# Patient Record
Sex: MALE | Employment: FULL TIME | URBAN - METROPOLITAN AREA
[De-identification: names, ages, dates, MRNs, and addresses within clinical notes are randomized per-mention and may not be internally consistent; named-entity substitution may affect disease eponyms.]

---

## 2017-03-21 ENCOUNTER — OFFICE VISIT (OUTPATIENT)
Dept: OTOLARYNGOLOGY | Facility: CLINIC | Age: 47
End: 2017-03-21
Payer: COMMERCIAL

## 2017-03-21 VITALS — HEIGHT: 68 IN | WEIGHT: 175 LBS | RESPIRATION RATE: 12 BRPM | BODY MASS INDEX: 26.52 KG/M2

## 2017-03-21 DIAGNOSIS — H93.8X3 SENSATION OF FULLNESS IN BOTH EARS: ICD-10-CM

## 2017-03-21 DIAGNOSIS — H61.23 BILATERAL IMPACTED CERUMEN: Primary | ICD-10-CM

## 2017-03-21 PROCEDURE — 99214 OFFICE O/P EST MOD 30 MIN: CPT | Mod: 25 | Performed by: OTOLARYNGOLOGY

## 2017-03-21 PROCEDURE — 69210 REMOVE IMPACTED EAR WAX UNI: CPT | Performed by: OTOLARYNGOLOGY

## 2017-03-21 NOTE — MR AVS SNAPSHOT
After Visit Summary   3/21/2017    Gerardo Simpson    MRN: [id number]           Patient Information     Date Of Birth          1970        Visit Information        Provider Department      3/21/2017 12:30 PM Jason Bangura MD [facility]        Today's Diagnoses     Bilateral impacted cerumen    -  1    Sensation of fullness in both ears          Care Instructions    Scheduling Information  To schedule your CT/MRI scan, please contact [facility] at [phone] OR [facility] at [phone]    To schedule your Surgery, please contact our Specialty Schedulers at [phone]      ENT Clinic Locations Clinic Hours Telephone Number     [facility]  [address]  [place] [address]   Monday:           1:00pm -- 5:00pm    Friday:              8:00am - 12:00pm   To schedule/reschedule an appointment with   Dr. Bangura,   please contact our   Specialty Scheduling Department at:     [phone]       [facility]  [address] [place] [address] Tuesday:          8:00am -- 2:00pm         Urgent Care Locations Clinic Hours Telephone Numbers     [facility]  [address] [place] [address]     Monday-Friday:     11:00am - 9:00pm    Saturday-Sunday:  9:00am - 5:00pm   [phone]     [facility]  [address] [place] [address]     Monday-Friday:      5:00pm - 9:00pm     Saturday-Sunday:  9:00am - 5:00pm   [phone]               Follow-ups after your visit        Who to contact     If you have questions or need follow up information about today's clinic visit or your schedule please contact [facility] directly at [phone].  Normal or non-critical lab and imaging results will be communicated to you by MyChart, letter or phone within 4 business days after the clinic has received the results. If you do not hear from us within 7 days, please contact the clinic through [employer]  "or phone. If you have a critical or abnormal lab result, we will notify you by phone as soon as possible.  Submit refill requests through [employer] or call your pharmacy and they will forward the refill request to us. Please allow 3 business days for your refill to be completed.          Additional Information About Your Visit        [employer] Information     [employer] gives you secure access to your electronic health record. If you see a primary care provider, you can also send messages to your care team and make appointments. If you have questions, please call your primary care clinic.  If you do not have a primary care provider, please call [phone] and they will assist you.        Care EveryWhere ID     This is your Care EveryWhere ID. This could be used by other organizations to access your [facility] medical records  [id number]        Your Vitals Were     Respirations Height BMI (Body Mass Index)             12 1.727 m (5' 8\") 26.61 kg/m2          Blood Pressure from Last 3 Encounters:   08/11/16 129/77   04/12/16 133/79   12/31/15 141/76    Weight from Last 3 Encounters:   03/21/17 79.4 kg (175 lb)   08/11/16 78.5 kg (173 lb)   05/10/16 77.6 kg (171 lb)              We Performed the Following     Remove [facility]        Primary Care Provider Office Phone # Fax #    Adilenechristy Del Rio PA-C [phone]       [facility] [address] [facility] [address]        Thank you!     Thank you for choosing [facility]  for your care. Our goal is always to provide you with excellent care. Hearing back from our patients is one way we can continue to improve our services. Please take a few minutes to complete the written survey that you may receive in the mail after your visit with us. Thank you!             Your Updated Medication List - Protect others around you: Learn how to safely use, store and throw away your medicines at www.disposemymeds.org.          This list is " accurate as of: 3/21/17 12:46 PM.  Always use your most recent med list.                   Brand Name Dispense Instructions for use    carbamide peroxide 6.5 % otic solution    DEBROX     Place 5-10 drops into both ears once a week

## 2017-03-21 NOTE — PROGRESS NOTES
"History of Present Illness - Gerardo Simpson is a 46 year old male here in follow up from the last visit on 5/10/2016.    To review, he has history of ear problems.  About 20 years ago he was told by a doctor that the RIGHT ear needed a medication to help, because the RIGHT ear was not hearing well. After that everything seemed great, no problems at all until now.  He is not sure if there was infection or not.  He came to his PCP about one month ago, and was given ear wax softeners, but it has not helped. The RIGHT ear is still stuffy, \"like there is water\" in the ear.  No previous ear surgery, no bloody or purulent discharge from the RIGHT ear during this new issue.    I felt that the big issue was heavy cerumen impaction which I cleared out, but wanted to see him back to make sure that was the issue, with an audiogram in case it didn't.  So when he returned on 5/10/16, I cleared the ears of even more debris, and the audiogram was thankfully, normal.      Therefore my recommendation was ear checks and debridement every 6 months.    Past Medical History -   Patient Active Problem List   Diagnosis     CARDIOVASCULAR SCREENING; LDL GOAL LESS THAN 160     Overweight (BMI 25.0-29.9)     Elevated blood pressure reading without diagnosis of hypertension     Chronic constipation     Pinguecula of both eyes       Current Medications -   Current Outpatient Prescriptions:      carbamide peroxide (DEBROX) 6.5 % otic (EAR) solution, Place 5-10 drops into both ears once a week, Disp: , Rfl:     Allergies - No Known Allergies    Social History -   Social History     Social History     Marital status:      Spouse name: N/A     Number of children: 2     Years of education: N/A     Occupational History      [employer]     Social History Main Topics     Smoking status: Never Smoker     Smokeless tobacco: Never Used     Alcohol use No     Drug use: No     Sexual activity: Yes     Partners: Female      Comment:  "     Other Topics Concern     Not on file     Social History Narrative    From [facility], in  2012       Family History -   Family History   Problem Relation Age of Onset     Hypertension Father      C.A.D. No family hx of      CEREBROVASCULAR DISEASE No family hx of      Thyroid Disease No family hx of      Glaucoma No family hx of      Macular Degeneration No family hx of      DIABETES Mother      Breast Cancer Mother      CANCER Mother        Review of Systems - As per HPI and PMHx, otherwise 10+ system review of the head and neck, and general constitution is negative.    Physical Exam  B/P: Data Unavailable, T: Data Unavailable, P: Data Unavailable, R: Data Unavailable  Vitals: There were no vitals taken for this visit.  BMI= There is no height or weight on file to calculate BMI.    General - The patient is well nourished and well developed, and appears to have good nutritional status.  Alert and oriented to person and place, answers questions and cooperates with examination appropriately.   Head and Face - Normocephalic and atraumatic, with no gross asymmetry noted of the contour of the facial features.  The facial nerve is intact, with strong symmetric movements.  Voice and Breathing - The patient was breathing comfortably without the use of accessory muscles. There was no wheezing, stridor, or stertor.  The patients voice was clear and strong, and had appropriate pitch and quality.  Eyes - Extraocular movements intact, and the pupils were reactive to light.  Sclera were not icteric or injected, conjunctiva were pink and moist.  Mouth - Examination of the oral cavity showed pink, healthy oral mucosa. No lesions or ulcerations noted.  The tongue was mobile and midline, and the dentition were in good condition.    Throat - The walls of the oropharynx were smooth, pink, moist, symmetric, and had no lesions or ulcerations.  The tonsillar pillars and soft palate were symmetric.  The uvula was midline on elevation.     Neck - Normal midline excursion of the laryngotracheal complex during swallowing.  Full range of motion on passive movement.  Palpation of the occipital, submental, submandibular, internal jugular chain, and supraclavicular nodes did not demonstrate any abnormal lymph nodes or masses.      Cerumen Removal    Physical Exam and Procedure  Ears - On examination of the ears, I found that the BOTH ears were completely impacted with dense cerumen, and just as before it is tenaciously thick and sticky like peanut butter.  Therefore, I positioned them in the examination chair in a semi-supine position, beginning with the right side.  I used the binocular surgical microscope to perform cerumen removal.  I began by using a cerumen loop to gently lift the edges of the cerumen mass away from the walls of the external canal.  Once I did this, I was able to suction away fragments of wax and debris using suction.  Once the mass was loose enough, the entire plug was pulled from the canal.  The tympanic membrane was intact, no sign of perforation or middle ear effusion.    I turned my attention to the contralateral side once again using the binocular surgical microscope to perform cerumen removal.  I began by using a cerumen loop to gently lift the edges of the cerumen mass away from the walls of the external canal.  Once I did this, I was able to suction away fragments of wax and debris using suction.  Once the mass was loose enough, the entire plug was pulled from the canal.  The tympanic membrane was intact, no sign of perforation or middle ear effusion.      A/P - Gerardo Simpson is a 46 year old male  (H61.23) Bilateral impacted cerumen  (primary encounter diagnosis)  (H93.8X3) Sensation of fullness in both ears  Comment:    The patient has had their cerumen procedurally removed today.  I have discussed ear care at home, including avoiding qtips or any other object placed in the canal.  I have also discussed that over the  counter cerumen kits like Debrox or Cerumenex can be useful.    If no other issues, follow up with me in one year for an ear check.

## 2017-03-21 NOTE — NURSING NOTE
"Chief Complaint   Patient presents with     RECHECK     ears        Initial Resp 12  Ht 1.727 m (5' 8\")  Wt 79.4 kg (175 lb)  BMI 26.61 kg/m2 Estimated body mass index is 26.61 kg/(m^2) as calculated from the following:    Height as of this encounter: 1.727 m (5' 8\").    Weight as of this encounter: 79.4 kg (175 lb).  Medication Reconciliation: complete     Karlos Hummel CMA      "

## 2017-03-28 NOTE — PATIENT INSTRUCTIONS
Scheduling Information  To schedule your CT/MRI scan, please contact [facility] at [phone] OR [facility] at [phone]    To schedule your Surgery, please contact our Specialty Schedulers at [phone]      ENT Clinic Locations Clinic Hours Telephone Number     [facility]  [address] [place] [address]   Monday:           1:00pm -- 5:00pm    Friday:              8:00am - 12:00pm   To schedule/reschedule an appointment with   Dr. Bangura,   please contact our   Specialty Scheduling Department at:     [phone]       [facility]  [address] [place] [address] Tuesday:          8:00am -- 2:00pm         Urgent Care Locations Clinic Hours Telephone Numbers     [facility]  [address] [place] [address]     Monday-Friday:     11:00am - 9:00pm    Saturday-Sunday:  9:00am - 5:00pm   [phone]     [facility]  [address] [place] [address]     Monday-Friday:      5:00pm - 9:00pm     Saturday-Sunday:  9:00am - 5:00pm   [phone]          Yes

## 2017-05-08 ENCOUNTER — OFFICE VISIT (OUTPATIENT)
Dept: FAMILY MEDICINE | Facility: CLINIC | Age: 47
End: 2017-05-08
Payer: COMMERCIAL

## 2017-05-08 VITALS
OXYGEN SATURATION: 96 % | WEIGHT: 182 LBS | BODY MASS INDEX: 27.58 KG/M2 | DIASTOLIC BLOOD PRESSURE: 82 MMHG | SYSTOLIC BLOOD PRESSURE: 142 MMHG | TEMPERATURE: 97.3 F | HEART RATE: 68 BPM | HEIGHT: 68 IN

## 2017-05-08 DIAGNOSIS — K64.8 INTERNAL HEMORRHOID: Primary | ICD-10-CM

## 2017-05-08 PROCEDURE — 99213 OFFICE O/P EST LOW 20 MIN: CPT | Performed by: INTERNAL MEDICINE

## 2017-05-08 RX ORDER — HYDROCORTISONE ACETATE 25 MG/1
25 SUPPOSITORY RECTAL 2 TIMES DAILY
Qty: 28 SUPPOSITORY | Refills: 1 | Status: SHIPPED | OUTPATIENT
Start: 2017-05-08 | End: 2017-05-22

## 2017-05-08 NOTE — PROGRESS NOTES
SUBJECTIVE:                                                    Gerardo Simpson is a 46 year old male who presents to clinic today for the following health issues:      Concern - bloody stool     Onset: 1 week    Description:   Blood stool    Intensity: severe    Progression of Symptoms:  same    Accompanying Signs & Symptoms:  Normal stool and possible hemorrhoid       Previous history of similar problem:   YES    Precipitating factors:   Worsened by:     Alleviating factors:  Improved by:        Therapies Tried and outcome: none          Problem list and histories reviewed & adjusted, as indicated.  Additional history: as documented    Patient Active Problem List   Diagnosis     CARDIOVASCULAR SCREENING; LDL GOAL LESS THAN 160     Overweight (BMI 25.0-29.9)     Elevated blood pressure reading without diagnosis of hypertension     Chronic constipation     Pinguecula of both eyes     Bilateral impacted cerumen     Past Surgical History:   Procedure Laterality Date     NO HISTORY OF SURGERY         Social History   Substance Use Topics     Smoking status: Never Smoker     Smokeless tobacco: Never Used     Alcohol use No     Family History   Problem Relation Age of Onset     Hypertension Father      C.A.D. No family hx of      CEREBROVASCULAR DISEASE No family hx of      Thyroid Disease No family hx of      Glaucoma No family hx of      Macular Degeneration No family hx of      DIABETES Mother      Breast Cancer Mother      CANCER Mother          No Known Allergies  Recent Labs   Lab Test  04/25/14   1638   LDL  113   HDL  37*   TRIG  175*   CR  0.97   GFRESTIMATED  84   GFRESTBLACK  >90   POTASSIUM  3.9      BP Readings from Last 3 Encounters:   05/08/17 142/82   08/11/16 129/77   04/12/16 133/79    Wt Readings from Last 3 Encounters:   05/08/17 182 lb (82.6 kg)   03/21/17 175 lb (79.4 kg)   08/11/16 173 lb (78.5 kg)                      ROS:  C: NEGATIVE for fever, chills, change in weight  I: NEGATIVE for  worrisome rashes, moles or lesions  E: NEGATIVE for vision changes or irritation  E/M: NEGATIVE for ear, mouth and throat problems  R: NEGATIVE for significant cough or SOB  CV: NEGATIVE for chest pain, palpitations or peripheral edema  GI:   : NEGATIVE for frequency, dysuria, or hematuria  M: NEGATIVE for significant arthralgias or myalgia  N: NEGATIVE for weakness, dizziness or paresthesias  E: NEGATIVE for temperature intolerance, skin/hair changes  H: NEGATIVE for bleeding problems  P: NEGATIVE for changes in mood or affect    OBJECTIVE:                                                      Body mass index is 27.67 kg/(m^2).  GENERAL: healthy, alert and no distress  EYES: Eyes grossly normal to inspection  ABDOMEN: soft, nontender, no hepatosplenomegaly, no masses and bowel sounds normal  RECTAL (male): normal sphincter tone, no rectal masses and internal  Hemorrhoid palpated during rectal examination.  MS: no gross musculoskeletal defects noted, no edema  SKIN: no suspicious lesions or rashes  NEURO: Normal strength and tone, mentation intact and speech normal    Diagnostic Test Results:  none      ASSESSMENT/PLAN:                                                            (K64.8) Internal hemorrhoid  (primary encounter diagnosis)  Comment: No associated symptoms such as diarrhea or significant change in bowel habits suggestive of inflammatory bowel disease.  Plan: hydrocortisone (ANUSOL-HC) 25 MG Suppository,         GENERAL SURG ADULT REFERRAL            Follow-up visit if condition worsens.      Bryant Ward MD  [facility]

## 2017-05-08 NOTE — MR AVS SNAPSHOT
After Visit Summary   5/8/2017    Gerardo Simpson    MRN: [id number]           Patient Information     Date Of Birth          1970        Visit Information        Provider Department      5/8/2017 11:20 AM Bryant Ward MD [facility]        Today's Diagnoses     Internal hemorrhoid    -  1      Care Instructions    This summary includes the important diagnoses, test, medications and other important parts of your medical history.  Below are a few good we sites you can use to learn more about these.     Www.[employer] : Up to date and easily searchable information on multiple topics.  [email or web address] : [facility] $4.99 medications  Www.medlineCro[employer] Technology.gov : medication info, interactive tutorials, watch real surgeries online  Www.familydoctor.org : good info from the Academy of Family Physicians  Www.[employer] : good info from the [facility]  Www.cdc.gov : public health info, travel advisories, epidemics (H1N1)  Www.aap.org : children's health info, normal development, vaccinations  Www.health.[facility].[place].us : [place] dept of heatlh, public health issues in [place], N1N1    Based on your medical history and these are the current health maintenance or preventive care services that you are due for (some may have been done at this visit:)  There are no preventive care reminders to display for this patient.  =================================================================================  Normal Values   Blood pressure  <140/90 for most adults    <130/80 for some chronic diseases (ask your care team about yours)    BMI (body mass index)  18.5-25 kg/m2 (based on height and weight)     Thank you for visiting [facility]    Normal or non-critical lab and imaging results will be communicated to you by MyChart, letter or phone within 7 days.  If you do not hear from us within 10 days, please call the clinic. If you have a critical or  abnormal lab result, we will notify you by phone as soon as possible.     If you have any questions regarding your visit please contact:     Team Comfort:   Clinic Hours Telephone Number   Dr. Pavel Xiao   7am-5pm  Monday - Friday [phone]  [facility]   Pharmacy 8am-8pm Monday-Thursday      8am-6pm Friday  9am-5pm Saturday-Sunday [phone]   Urgent Care 11am-8pm Monday-Friday        9am-5pm Saturday-Sunday [phone]     After hours, weekend or if you need to make an appointment with your primary provider please call [phone].   After Hours nurse advise: call [facility]: [phone]    Medication Refills:  Call your pharmacy and they will forward the refill to us. Please allow 3 business days for your refills to be completed.    Use [employer] (secure email communication and access to your chart) to send your primary care provider a message or make an appointment. Ask someone on your Team how to sign up for [employer]. To log on to [employer] or for more information in Language Learning Class please visit the website at [email or web address].  As of October 8, 2013, all password changes, disabled accounts, or ID changes in [employer]/MyHealth will be done by our Access Services Department.   If you need help with your account or password, call: [phone]. Clinic staff no longer has the ability to change passwords.     Hemorrhoids    Hemorrhoids are swollen and inflamed veins inside the rectum and near the anus. The rectum is the last several inches of the colon. The anus is the passage between the rectum and the outside of the body.  Causes   The veins can become swollen due to increased pressure in them. This is most often caused by:    Chronic constipation or diarrhea    Straining when having a bowel movement    Sitting too long on the toilet    A low-fiber diet    Pregnancy  Symptoms     Bleeding from the rectum (this may be  noticeable after bowel movements)    Lump near the anus    Itching around the anus    Pain around the anus  There are different types of hemorrhoids. Depending on the type you have and the severity, you may be able to treat yourself at home. In some cases, a procedure may be the best treatment option. Your healthcare provider can tell you more about this, if needed.  Home care  General care    To get relief from pain or itching, try:    Topical products. Your healthcare provider may prescribe or recommend creams, ointments, or pads that can be applied to the hemorrhoid. Use these exactly as directed.    Medicines. Your healthcare provider may recommend stool softeners, suppositories, or laxatives to help manage constipation. Use these exactly as directed.    Sitz baths. A sitz bath involves sitting in a few inches of warm bath water. Be careful not to make the water so hot that you burn yourself--test it before sitting in it. Soak for about 10 to 15 minutes a few times a day. This may help relieve pain.  Tips to help prevent hemorrhoids    Eat more fiber. Fiber adds bulk to stool and absorbs water as it moves through your colon. This makes stool softer and easier to pass.    Increase the fiber in your diet with more fiber-rich foods. These include fresh fruit, vegetables, and whole grains.    Take a fiber supplement or bulking agent, if advised to by your provider. These include products such as psyllium or methylcellulose.    Drink plenty of water, if directed to by your provider. This can help keep stool soft.    Be more active. Frequent exercise aids digestion and helps prevent constipation. It may also help make bowel movements more regular.    Don t strain during bowel movements. This can make hemorrhoids more likely. Also, don t sit on the toilet for long periods of time.  Follow-up care  Follow up with your healthcare provider, or as advised. If a culture or imaging tests were done, you will be notified of  the results when they are ready. This may take a few days or longer.  When to seek medical advice  Call your healthcare provider right away if any of these occur:    Increased bleeding from the rectum    Increased pain around the rectum or anus    Weakness or dizziness   Call 911   Call 911 or return to the emergency department right away if any of these occur:    Trouble breathing or swallowing    Fainting or loss of consciousness    Unusually fast heart rate    Vomiting blood    Large amounts of blood in stool      8043-0386 [employer]. [address] [place] [address]. All rights reserved. This information is not intended as a substitute for professional medical care. Always follow your healthcare professional's instructions.        Understanding Hemorrhoids  Hemorrhoid tissues are  cushions  of blood vessels that swell slightly during bowel movements. Too much pressure on the anal canal can make these tissues remain enlarged and cause symptoms. This can happen both inside and outside the anal canal.    Parts of the Anal Canal    Internal hemorrhoid tissue is in the upper area of the anal canal.    The rectum is the last several inches of the colon. This is where stool is stored prior to bowel movements.    Anal sphincters are ring-shaped muscles that expand and contract to control the anal opening.    External hemorrhoid tissue lies under the anal skin.    The anus is the passage between the rectum and the outside of the body.  Normal Hemorrhoid Tissue  Hemorrhoid tissues play an important role in helping your body eliminate waste. Food passes from the stomach through the intestines. The waste (stool) then travels through the colon to the rectum. It is stored in the rectum until it s ready to be passed from the anus. During bowel movements, hemorrhoids swell with blood and become slightly larger. This swelling helps protect and cushion the anal canal as stool passes from the body. Once  the stool has passed, the tissues stop swelling and return to normal.    Problem Hemorrhoids  Pressure due to straining or other factors can cause hemorrhoid tissues to remain swollen. When this happens to the hemorrhoid tissues in the anal canal they re called internal hemorrhoids. Swollen tissues around the anal opening are called external hemorrhoids. Depending on the location, your symptoms can differ.    Internal hemorrhoids often occur in clusters around the wall of the anal canal. They are usually painless. But they may prolapse (protrude out of the anus) due to straining or pressure from hard stool. After the bowel movement is over, they may then reduce (return inside the body). Internal hemorrhoids often bleed. They can also discharge mucus.    External hemorrhoids are located at the anal opening, just beneath the skin. These tissues rarely cause problems unless they thrombose (form a blood clot). When this occurs, a hard, bluish lump may appear. A thrombosed hemorrhoid also causes sudden, severe pain. In time, the clot may go away on its own. This sometimes leaves a  skin tag  of tissue stretched by the clot.  Hemorrhoid Symptoms  Hemorrhoid symptoms may include:    Pain or a burning sensation    Bleeding during bowel movements    Protrusion of tissue from the anus    Itching around the anus  Causes of Hemorrhoids  There s no single cause of hemorrhoids. Most often, though, they are caused by too much pressure on the anal canal. This can be due to:    Chronic (ongoing) constipation    Straining during bowel movements    Sitting too long on the toilet    Strenuous exercise or heavy lifting   Pregnancy and childbirth    Aging    Diarrhea     4316-0054 [employer]. [address] [place] [address], [place] [address]. All rights reserved. This information is not intended as a substitute for professional medical care. Always follow your healthcare professional's instructions.        Treating Hemorrhoids:  Surgery  Your doctor may recommend surgery to remove hemorrhoids that cause severe symptoms. Your doctor can explain the procedure that will be used. You ll also be told how to get ready for surgery, and what to expect while you recover.  Getting Ready for Surgery  Your surgery will be done at a hospital or surgical center. Be sure to follow all your doctor s guidelines to prepare for surgery.    Tell your doctor what medications you take. This includes blood thinners, aspirin, and ibuprofen. Also mention if you take any herbal remedies or supplements. In some cases, you may need to stop taking them a week before surgery.    Stop smoking.    Arrange for an adult family member or friend to give you a ride home after the procedure.    Stop eating and drinking before midnight, the night before your surgery.        Risks and Complications  The possible risks and complications of the treatments described on these pages include:    Infection    Bleeding    Trouble urinating    Narrowing of the anal canal (very rare)  When to Call Your Doctor  After surgery, call your doctor if you have any of the following:    Increasing pain    Persistent bleeding    Fever or chills    Inability to move your bowels    Trouble urinating   The Day of Surgery  Arrive at the hospital or surgery center on time. You will be asked to sign some forms and change into a patient gown. You ll then be given an IV (intravenous line), which supplies fluids and medication. You may also be given a laxative or enema to clean stool from your rectum. Just before surgery, you ll talk with an anesthesiologist. He or she can explain the type of medication used to prevent pain during surgery.    Local anesthesia numbs just the surgical area.    Monitored sedation makes you relaxed and drowsy.    Regional anesthesia numbs certain areas of your body.    General anesthesia lets you sleep during the procedure.  During Surgery  Your doctor will insert an anoscope to  view the anal canal. Using surgical tools, the swollen hemorrhoids are then removed. In some cases, the incision is closed with sutures. In other cases, you may have a procedure that closes the incision with staples.  Hemorrhoidectomy with Sutures  The hemorrhoids are removed using surgical tools, such as a scalpel or cautery (sealing) device. The incision is then closed with sutures. In some cases, the incision may be left partially open. This allows fluid to drain and helps the healing process.       Staples help prevent tissue in the anal canal from sagging and prolapsing.   Stapled Hemorrhoidopexy  This procedure uses a special device to remove a ring of tissue from the anal canal. Removing the tissue cuts off blood supply to the hemorrhoids, causing them to shrink. The tissue ring is then secured with staples. This helps hold the tissue in place.  After Surgery  You ll be taken to a recovery area to rest for a while. You can usually go home the same day. But in some cases you may need to remain in the hospital overnight. For a short time after surgery you may have nausea, minor bleeding, and discharge. You ll also likely have some pain. To help you feel better, your doctor will prescribe pain medication. You may also be prescribed medications to help make bowel movements easier.    1806-0471 The "[employer] [address] [place] [address], [place] [address]. All rights reserved. This information is not intended as a substitute for professional medical care. Always follow your healthcare professional's instructions.              Follow-ups after your visit        Who to contact     If you have questions or need follow up information about today's clinic visit or your schedule please contact [facility] directly at [phone].  Normal or non-critical lab and imaging results will be communicated to you by MyChart, letter or phone within 4 business days after the clinic has received the  "results. If you do not hear from us within 7 days, please contact the clinic through [employer] or phone. If you have a critical or abnormal lab result, we will notify you by phone as soon as possible.  Submit refill requests through [employer] or call your pharmacy and they will forward the refill request to us. Please allow 3 business days for your refill to be completed.          Additional Information About Your Visit        [employer] Information     [employer] gives you secure access to your electronic health record. If you see a primary care provider, you can also send messages to your care team and make appointments. If you have questions, please call your primary care clinic.  If you do not have a primary care provider, please call [phone] and they will assist you.        Care EveryWhere ID     This is your Care EveryWhere ID. This could be used by other organizations to access your [facility] medical records  [id number]        Your Vitals Were     Pulse Temperature Height Pulse Oximetry BMI (Body Mass Index)       68 97.3  F (36.3  C) (Oral) 5' 8\" (1.727 m) 96% 27.67 kg/m2        Blood Pressure from Last 3 Encounters:   05/08/17 142/82   08/11/16 129/77   04/12/16 133/79    Weight from Last 3 Encounters:   05/08/17 182 lb (82.6 kg)   03/21/17 175 lb (79.4 kg)   08/11/16 173 lb (78.5 kg)              Today, you had the following     No orders found for display         Today's Medication Changes          These changes are accurate as of: 5/8/17 12:06 PM.  If you have any questions, ask your nurse or doctor.               Start taking these medicines.        Dose/Directions    hydrocortisone 25 MG Suppository   Commonly known as:  ANUSOL-HC   Used for:  Internal hemorrhoid   Started by:  Bryant Ward MD        Dose:  25 mg   Place 1 suppository (25 mg) rectally 2 times daily for 14 days   Quantity:  28 suppository   Refills:  1            Where to get your medicines      These medications were sent [place] " [facility] [place] - [address]  [address], [facility] [address]     Phone:  [phone]     hydrocortisone 25 MG Suppository                Primary Care Provider Office Phone # Fax #    Adilene Maggy Del Rio PA-C [phone]       [facility] [address]  [facility] [address]        Thank you!     Thank you for choosing [facility]  for your care. Our goal is always to provide you with excellent care. Hearing back from our patients is one way we can continue to improve our services. Please take a few minutes to complete the written survey that you may receive in the mail after your visit with us. Thank you!             Your Updated Medication List - Protect others around you: Learn how to safely use, store and throw away your medicines at www.disposemymeds.org.          This list is accurate as of: 5/8/17 12:06 PM.  Always use your most recent med list.                   Brand Name Dispense Instructions for use    hydrocortisone 25 MG Suppository    ANUSOL-HC    28 suppository    Place 1 suppository (25 mg) rectally 2 times daily for 14 days

## 2017-05-08 NOTE — PATIENT INSTRUCTIONS
This summary includes the important diagnoses, test, medications and other important parts of your medical history.  Below are a few good we sites you can use to learn more about these.     Www.[employer]tc[employer] Edutainment.org : Up to date and easily searchable information on multiple topics.  Www.[employer]tc[employer] Edutainment.org/[email or web address]/c_[email or web address] : [facility] $4.99 medications  Www.medlineplus.gov : medication info, interactive tutorials, watch real surgeries online  Www.familydoctor.org : good info from the Academy of Family Physicians  Www.mayoXinyi Networkinic.com : good info from the [facility]  Www.cdc.gov : public health info, travel advisories, epidemics (H1N1)  Www.aap.org : children's health info, normal development, vaccinations  Www.health.[facility].[place].us : [place] dept of heat, public health issues in [place], N1N1    Based on your medical history and these are the current health maintenance or preventive care services that you are due for (some may have been done at this visit:)  There are no preventive care reminders to display for this patient.  =================================================================================  Normal Values   Blood pressure  <140/90 for most adults    <130/80 for some chronic diseases (ask your care team about yours)    BMI (body mass index)  18.5-25 kg/m2 (based on height and weight)     Thank you for visiting [facility]    Normal or non-critical lab and imaging results will be communicated to you by MyChart, letter or phone within 7 days.  If you do not hear from us within 10 days, please call the clinic. If you have a critical or abnormal lab result, we will notify you by phone as soon as possible.     If you have any questions regarding your visit please contact:     Team Comfort:   Clinic Hours Telephone Number   Dr. Pavel Xiao   7am-5pm  Monday - Friday [phone]  [facility]   Pharmacy 8am-8pm  Monday-Thursday      8am-6pm Friday  9am-5pm Saturday-Sunday [phone]   Urgent Care 11am-8pm Monday-Friday        9am-5pm Saturday-Sunday [phone]     After hours, weekend or if you need to make an appointment with your primary provider please call [phone].   After Hours nurse advise: call [facility]: [phone]    Medication Refills:  Call your pharmacy and they will forward the refill to us. Please allow 3 business days for your refills to be completed.    Use [employer] Ideas (secure email communication and access to your chart) to send your primary care provider a message or make an appointment. Ask someone on your Team how to sign up for Engineering Ideas. To log on to [employer] or for more information in [employer] please visit the website at [email or web address].  As of October 8, 2013, all password changes, disabled accounts, or ID changes in Engineering Ideas/MyHealth will be done by our Access Services Department.   If you need help with your account or password, call: [phone]. Clinic staff no longer has the ability to change passwords.     Hemorrhoids    Hemorrhoids are swollen and inflamed veins inside the rectum and near the anus. The rectum is the last several inches of the colon. The anus is the passage between the rectum and the outside of the body.  Causes   The veins can become swollen due to increased pressure in them. This is most often caused by:    Chronic constipation or diarrhea    Straining when having a bowel movement    Sitting too long on the toilet    A low-fiber diet    Pregnancy  Symptoms     Bleeding from the rectum (this may be noticeable after bowel movements)    Lump near the anus    Itching around the anus    Pain around the anus  There are different types of hemorrhoids. Depending on the type you have and the severity, you may be able to treat yourself at home. In some cases, a procedure may be the best treatment option. Your healthcare provider can tell you more about  this, if needed.  Home care  General care    To get relief from pain or itching, try:    Topical products. Your healthcare provider may prescribe or recommend creams, ointments, or pads that can be applied to the hemorrhoid. Use these exactly as directed.    Medicines. Your healthcare provider may recommend stool softeners, suppositories, or laxatives to help manage constipation. Use these exactly as directed.    Sitz baths. A sitz bath involves sitting in a few inches of warm bath water. Be careful not to make the water so hot that you burn yourself--test it before sitting in it. Soak for about 10 to 15 minutes a few times a day. This may help relieve pain.  Tips to help prevent hemorrhoids    Eat more fiber. Fiber adds bulk to stool and absorbs water as it moves through your colon. This makes stool softer and easier to pass.    Increase the fiber in your diet with more fiber-rich foods. These include fresh fruit, vegetables, and whole grains.    Take a fiber supplement or bulking agent, if advised to by your provider. These include products such as psyllium or methylcellulose.    Drink plenty of water, if directed to by your provider. This can help keep stool soft.    Be more active. Frequent exercise aids digestion and helps prevent constipation. It may also help make bowel movements more regular.    Don t strain during bowel movements. This can make hemorrhoids more likely. Also, don t sit on the toilet for long periods of time.  Follow-up care  Follow up with your healthcare provider, or as advised. If a culture or imaging tests were done, you will be notified of the results when they are ready. This may take a few days or longer.  When to seek medical advice  Call your healthcare provider right away if any of these occur:    Increased bleeding from the rectum    Increased pain around the rectum or anus    Weakness or dizziness   Call 911   Call 911 or return to the emergency department right away if any of  these occur:    Trouble breathing or swallowing    Fainting or loss of consciousness    Unusually fast heart rate    Vomiting blood    Large amounts of blood in stool      2293-6670 [employer]. [address] [place] [address], [place] [address]. All rights reserved. This information is not intended as a substitute for professional medical care. Always follow your healthcare professional's instructions.        Understanding Hemorrhoids  Hemorrhoid tissues are  cushions  of blood vessels that swell slightly during bowel movements. Too much pressure on the anal canal can make these tissues remain enlarged and cause symptoms. This can happen both inside and outside the anal canal.    Parts of the Anal Canal    Internal hemorrhoid tissue is in the upper area of the anal canal.    The rectum is the last several inches of the colon. This is where stool is stored prior to bowel movements.    Anal sphincters are ring-shaped muscles that expand and contract to control the anal opening.    External hemorrhoid tissue lies under the anal skin.    The anus is the passage between the rectum and the outside of the body.  Normal Hemorrhoid Tissue  Hemorrhoid tissues play an important role in helping your body eliminate waste. Food passes from the stomach through the intestines. The waste (stool) then travels through the colon to the rectum. It is stored in the rectum until it s ready to be passed from the anus. During bowel movements, hemorrhoids swell with blood and become slightly larger. This swelling helps protect and cushion the anal canal as stool passes from the body. Once the stool has passed, the tissues stop swelling and return to normal.    Problem Hemorrhoids  Pressure due to straining or other factors can cause hemorrhoid tissues to remain swollen. When this happens to the hemorrhoid tissues in the anal canal they re called internal hemorrhoids. Swollen tissues around the anal opening are called external  hemorrhoids. Depending on the location, your symptoms can differ.    Internal hemorrhoids often occur in clusters around the wall of the anal canal. They are usually painless. But they may prolapse (protrude out of the anus) due to straining or pressure from hard stool. After the bowel movement is over, they may then reduce (return inside the body). Internal hemorrhoids often bleed. They can also discharge mucus.    External hemorrhoids are located at the anal opening, just beneath the skin. These tissues rarely cause problems unless they thrombose (form a blood clot). When this occurs, a hard, bluish lump may appear. A thrombosed hemorrhoid also causes sudden, severe pain. In time, the clot may go away on its own. This sometimes leaves a  skin tag  of tissue stretched by the clot.  Hemorrhoid Symptoms  Hemorrhoid symptoms may include:    Pain or a burning sensation    Bleeding during bowel movements    Protrusion of tissue from the anus    Itching around the anus  Causes of Hemorrhoids  There s no single cause of hemorrhoids. Most often, though, they are caused by too much pressure on the anal canal. This can be due to:    Chronic (ongoing) constipation    Straining during bowel movements    Sitting too long on the toilet    Strenuous exercise or heavy lifting   Pregnancy and childbirth    Aging    Diarrhea     [id number] [employer]. [address] [place] [address], [place] [address]. All rights reserved. This information is not intended as a substitute for professional medical care. Always follow your healthcare professional's instructions.        Treating Hemorrhoids: Surgery  Your doctor may recommend surgery to remove hemorrhoids that cause severe symptoms. Your doctor can explain the procedure that will be used. You ll also be told how to get ready for surgery, and what to expect while you recover.  Getting Ready for Surgery  Your surgery will be done at a hospital or surgical center. Be sure to follow  all your doctor s guidelines to prepare for surgery.    Tell your doctor what medications you take. This includes blood thinners, aspirin, and ibuprofen. Also mention if you take any herbal remedies or supplements. In some cases, you may need to stop taking them a week before surgery.    Stop smoking.    Arrange for an adult family member or friend to give you a ride home after the procedure.    Stop eating and drinking before midnight, the night before your surgery.        Risks and Complications  The possible risks and complications of the treatments described on these pages include:    Infection    Bleeding    Trouble urinating    Narrowing of the anal canal (very rare)  When to Call Your Doctor  After surgery, call your doctor if you have any of the following:    Increasing pain    Persistent bleeding    Fever or chills    Inability to move your bowels    Trouble urinating   The Day of Surgery  Arrive at the hospital or surgery center on time. You will be asked to sign some forms and change into a patient gown. You ll then be given an IV (intravenous line), which supplies fluids and medication. You may also be given a laxative or enema to clean stool from your rectum. Just before surgery, you ll talk with an anesthesiologist. He or she can explain the type of medication used to prevent pain during surgery.    Local anesthesia numbs just the surgical area.    Monitored sedation makes you relaxed and drowsy.    Regional anesthesia numbs certain areas of your body.    General anesthesia lets you sleep during the procedure.  During Surgery  Your doctor will insert an anoscope to view the anal canal. Using surgical tools, the swollen hemorrhoids are then removed. In some cases, the incision is closed with sutures. In other cases, you may have a procedure that closes the incision with staples.  Hemorrhoidectomy with Sutures  The hemorrhoids are removed using surgical tools, such as a scalpel or cautery (sealing)  device. The incision is then closed with sutures. In some cases, the incision may be left partially open. This allows fluid to drain and helps the healing process.       Staples help prevent tissue in the anal canal from sagging and prolapsing.   Stapled Hemorrhoidopexy  This procedure uses a special device to remove a ring of tissue from the anal canal. Removing the tissue cuts off blood supply to the hemorrhoids, causing them to shrink. The tissue ring is then secured with staples. This helps hold the tissue in place.  After Surgery  You ll be taken to a recovery area to rest for a while. You can usually go home the same day. But in some cases you may need to remain in the hospital overnight. For a short time after surgery you may have nausea, minor bleeding, and discharge. You ll also likely have some pain. To help you feel better, your doctor will prescribe pain medication. You may also be prescribed medications to help make bowel movements easier.    [id number] [employer]. [address] [place] [address], [place] [address]. All rights reserved. This information is not intended as a substitute for professional medical care. Always follow your healthcare professional's instructions.

## 2017-05-08 NOTE — NURSING NOTE
"Chief Complaint   Patient presents with     Rectal Bleeding     bloody stool       Initial /85 (BP Location: Left arm, Patient Position: Chair, Cuff Size: Adult Regular)  Pulse 68  Temp 97.3  F (36.3  C) (Oral)  Ht 5' 8\" (1.727 m)  Wt 182 lb (82.6 kg)  SpO2 96%  BMI 27.67 kg/m2 Estimated body mass index is 27.67 kg/(m^2) as calculated from the following:    Height as of this encounter: 5' 8\" (1.727 m).    Weight as of this encounter: 182 lb (82.6 kg).  Medication Reconciliation: complete     Omar Martinez MA      "

## 2017-12-12 ENCOUNTER — OFFICE VISIT (OUTPATIENT)
Dept: FAMILY MEDICINE | Facility: CLINIC | Age: 47
End: 2017-12-12
Payer: COMMERCIAL

## 2017-12-12 VITALS
HEIGHT: 68 IN | HEART RATE: 66 BPM | OXYGEN SATURATION: 98 % | BODY MASS INDEX: 28.49 KG/M2 | DIASTOLIC BLOOD PRESSURE: 70 MMHG | SYSTOLIC BLOOD PRESSURE: 156 MMHG | TEMPERATURE: 97.8 F | WEIGHT: 188 LBS

## 2017-12-12 DIAGNOSIS — Z11.1 SCREENING EXAMINATION FOR PULMONARY TUBERCULOSIS: ICD-10-CM

## 2017-12-12 DIAGNOSIS — Z13.6 CARDIOVASCULAR SCREENING; LDL GOAL LESS THAN 160: ICD-10-CM

## 2017-12-12 DIAGNOSIS — Z00.00 ENCOUNTER FOR ROUTINE ADULT HEALTH EXAMINATION WITHOUT ABNORMAL FINDINGS: Primary | ICD-10-CM

## 2017-12-12 DIAGNOSIS — Z23 NEED FOR PROPHYLACTIC VACCINATION AND INOCULATION AGAINST INFLUENZA: ICD-10-CM

## 2017-12-12 DIAGNOSIS — R03.0 ELEVATED BLOOD PRESSURE READING WITHOUT DIAGNOSIS OF HYPERTENSION: ICD-10-CM

## 2017-12-12 LAB
ANION GAP SERPL CALCULATED.3IONS-SCNC: 11 MMOL/L (ref 3–14)
BUN SERPL-MCNC: 9 MG/DL (ref 7–30)
CALCIUM SERPL-MCNC: 8.8 MG/DL (ref 8.5–10.1)
CHLORIDE SERPL-SCNC: 104 MMOL/L (ref 94–109)
CHOLEST SERPL-MCNC: 202 MG/DL
CO2 SERPL-SCNC: 25 MMOL/L (ref 20–32)
CREAT SERPL-MCNC: 0.92 MG/DL (ref 0.66–1.25)
GFR SERPL CREATININE-BSD FRML MDRD: 88 ML/MIN/1.7M2
GLUCOSE SERPL-MCNC: 103 MG/DL (ref 70–99)
HDLC SERPL-MCNC: 54 MG/DL
LDLC SERPL CALC-MCNC: 103 MG/DL
NONHDLC SERPL-MCNC: 148 MG/DL
POTASSIUM SERPL-SCNC: 3.4 MMOL/L (ref 3.4–5.3)
SODIUM SERPL-SCNC: 140 MMOL/L (ref 133–144)
TRIGL SERPL-MCNC: 227 MG/DL

## 2017-12-12 PROCEDURE — 80048 BASIC METABOLIC PNL TOTAL CA: CPT | Performed by: FAMILY MEDICINE

## 2017-12-12 PROCEDURE — 99396 PREV VISIT EST AGE 40-64: CPT | Mod: 25 | Performed by: FAMILY MEDICINE

## 2017-12-12 PROCEDURE — 80061 LIPID PANEL: CPT | Performed by: FAMILY MEDICINE

## 2017-12-12 PROCEDURE — 36415 COLL VENOUS BLD VENIPUNCTURE: CPT | Performed by: FAMILY MEDICINE

## 2017-12-12 PROCEDURE — 90686 IIV4 VACC NO PRSV 0.5 ML IM: CPT | Performed by: FAMILY MEDICINE

## 2017-12-12 PROCEDURE — 90471 IMMUNIZATION ADMIN: CPT | Performed by: FAMILY MEDICINE

## 2017-12-12 PROCEDURE — 86480 TB TEST CELL IMMUN MEASURE: CPT | Performed by: FAMILY MEDICINE

## 2017-12-12 ASSESSMENT — PAIN SCALES - GENERAL: PAINLEVEL: NO PAIN (0)

## 2017-12-12 NOTE — PROGRESS NOTES
SUBJECTIVE:   CC: Gerardo Simpson is an 47 year old male who presents for preventative health visit.     He also brings forms to fill out regarding his preventative health visit and his [place] record for [place] [facility] [employer] [facility] [place]    Healthy Habits:    Do you get at least three servings of calcium containing foods daily (dairy, green leafy vegetables, etc.)? yes    Amount of exercise or daily activities, outside of work: 7 day(s) per week    Problems taking medications regularly not applicable    Medication side effects: No    Have you had an eye exam in the past two years? yes    Do you see a dentist twice per year? no    Do you have sleep apnea, excessive snoring or daytime drowsiness?no      Today's PHQ-2 Score:   PHQ-2 ( 1999 Pfizer) 12/12/2017 5/8/2017   Q1: Little interest or pleasure in doing things 0 0   Q2: Feeling down, depressed or hopeless 0 0   PHQ-2 Score 0 0       Abuse: Current or Past(Physical, Sexual or Emotional)- NO  Do you feel safe in your environment - YES  Social History   Substance Use Topics     Smoking status: Never Smoker     Smokeless tobacco: Never Used     Alcohol use No     The patient does not drink >3 drinks per day nor >7 drinks per week.    Past medical, family, and social histories, medications, and allergies are reviewed and updated in Epic.     ROS:  C: NEGATIVE for fever, chills, change in weight  I: NEGATIVE for worrisome rashes, moles or lesions  E: NEGATIVE for vision changes or irritation  ENT: NEGATIVE for ear, mouth and throat problems  R: NEGATIVE for significant cough or SOB  CV: He mentions he has not gotten a lot of sleep recently, which could contribute to his high blood pressure reading today. NEGATIVE for chest pain, palpitations or peripheral edema  GI: NEGATIVE for nausea, abdominal pain, heartburn, or change in bowel habits   male: negative for dysuria, hematuria, decreased urinary stream, erectile dysfunction, urethral discharge  M:  "NEGATIVE for significant arthralgias or myalgia  N: NEGATIVE for weakness, dizziness or paresthesias  P: NEGATIVE for changes in mood or affect    This document serves as a record of the services and decisions personally performed and made by Dr. Hidalgo. It was created on his behalf by Lauren Hawthorne, a trained medical scribe. The creation of this document is based the provider's statements to the medical scribe.  Lauren Hawthorne December 12, 2017 11:23 AM   OBJECTIVE:   /70  Pulse 66  Temp 97.8  F (36.6  C) (Oral)  Ht 1.727 m (5' 8\")  Wt 85.3 kg (188 lb)  SpO2 98%  BMI 28.59 kg/m2     EXAM:  GENERAL: healthy, alert and no distress  EYES: Eyes grossly normal to inspection, PERRL and conjunctivae and sclerae normal  HENT: ear canals and TM's normal, nose and mouth without ulcers or lesions  NECK: no adenopathy, no asymmetry, masses, or scars and thyroid normal to palpation  RESP: lungs clear to auscultation, no crackles or wheezes, no areas of dullness, no tachypnea   CV: regular rate and rhythm, normal S1 S2, no S3 or S4, no murmur, click or rub, no peripheral edema and peripheral pulses strong  ABDOMEN: soft, nontender, no hepatosplenomegaly, no masses and bowel sounds normal   (male): normal male genitalia without lesions or urethral discharge, no hernia  MS: no gross musculoskeletal defects noted, no edema  SKIN: no suspicious lesions or rashes  NEURO: Normal strength and tone, mentation intact and speech normal, DTRs symmetrical, cranial nerves 2-12 intact   PSYCH: mentation appears normal, affect normal/bright     ASSESSMENT/PLAN:   (Z00.00) Encounter for routine adult health examination without abnormal findings  (primary encounter diagnosis)  Comment: Negative screening exam; up-to-date on preventive services.   Plan: M Tuberculosis by [facility], HC FLU VAC         PRESRV FREE QUAD SPLIT VIR 3+YRS IM, Lipid         panel reflex to direct LDL Non-fasting, Basic         metabolic panel       " " Follow up in 1 year.    (R03.0) Elevated blood pressure reading without diagnosis of hypertension  Comment: Second hypertensive reading today.  Plan: Basic metabolic panel        Handouts provided. Return in about 1 week (around 12/19/2017) for blood pressure check. If elevated then, will start medication.    (Z13.6) CARDIOVASCULAR SCREENING; LDL GOAL LESS THAN 160  Comment: historically at goal. Non-fasting (last had juice around 10:30am).  Plan: Lipid panel reflex to direct LDL Non-fasting        Monitor periodically.     (Z11.1) Screening examination for pulmonary tuberculosis  Comment: Screening requested by the patient.  Plan: M Tuberculosis by [facility]            (Z23) Need for prophylactic vaccination and inoculation against influenza  Comment: Influenza vaccine offered and accepted by patient.   Plan: HC FLU VAC PRESRV FREE QUAD SPLIT VIR 3+YRS IM            COUNSELING:  Reviewed preventive health counseling, as reflected in patient instructions  Special attention given to:        Regular exercise       Immunizations    Vaccinated for: Influenza        BP Screening:   Last 3 BP Readings:    BP Readings from Last 3 Encounters:   12/12/17 156/70   05/08/17 142/82   08/11/16 129/77     The following was recommended to the patient:  Recommend lifestyle modifications and Laboratory tests and follow up appointment in 1 week.       reports that he has never smoked. He has never used smokeless tobacco.      Estimated body mass index is 28.59 kg/(m^2) as calculated from the following:    Height as of this encounter: 1.727 m (5' 8\").    Weight as of this encounter: 85.3 kg (188 lb).   Weight management plan: encouraged increased aerobic exercise, as outlined in the AVS. He walks for exercise, around 30 minutes minimum.     Counseling Resources:  ATP IV Guidelines  Pooled Cohorts Equation Calculator  FRAX Risk Assessment  ICSI Preventive Guidelines  Dietary Guidelines for Americans, 2010  USDA's MyPlate  ASA " Prophylaxis  Lung CA Screening    The information in this document, created by the medical scribe for me, accurately reflects the services I personally performed and the decisions made by me. I have reviewed and approved this document for accuracy prior to leaving the patient care area. December 12, 2017 11:23 AM    Pavel Hidalgo MD  [facility]                    Injectable Influenza Immunization Documentation    1.  Is the person to be vaccinated sick today?   No    2. Does the person to be vaccinated have an allergy to a component   of the vaccine?   No  Egg Allergy Algorithm Link    3. Has the person to be vaccinated ever had a serious reaction   to influenza vaccine in the past?   No    4. Has the person to be vaccinated ever had Guillain-Barré syndrome?   No    Form completed by CONSTANCE Tabares MA

## 2017-12-12 NOTE — PATIENT INSTRUCTIONS
At [facility], we strive to deliver an exceptional experience to you, every time we see you.  If you receive a survey in the mail, please send us back your thoughts. We really do value your feedback.    Based on your medical history, these are the current health maintenance/preventive care services that you are due for (some may have been done at this visit.)  Health Maintenance Due   Topic Date Due     EYE EXAM Q1 YEAR  08/10/2017     INFLUENZA VACCINE (SYSTEM ASSIGNED)  09/01/2017         Suggested websites for health information:  Www.Laimoon.com.HomeCon : Up to date and easily searchable information on multiple topics.  Www.FreeATM.gov : medication info, interactive tutorials, watch real surgeries online  Www.familydoctor.org : good info from the Academy of Family Physicians  Www.cdc.gov : public health info, travel advisories, epidemics (H1N1)  Www.aap.org : children's health info, normal development, vaccinations  Www.health.[facility].[place].us : [place] dept of health, public health issues in [place], N1N1    Your care team:                            Family Medicine Internal Medicine   MD Bryant Mac MD Shantel Branch-Fleming, MD Katya Georgiev PA-C Nam Ho, MD Pediatrics   THERESE Vega, BRYANNA FRANCOIS CNP   MD Yane Green MD Deborah Mielke, MD Kim Thein, APRN [facility]      Clinic hours: Monday - Thursday 7 am-7 pm; Fridays 7 am-5 pm.   Urgent care: Monday - Friday 11 am-9 pm; Saturday and Sunday 9 am-5 pm.  Pharmacy : Monday -Thursday 8 am-8 pm; Friday 8 am-6 pm; Saturday and Sunday 9 am-5 pm.     Clinic: [phone]   Pharmacy: [phone]      Preventive Health Recommendations  Male Ages 40 to 49    Yearly exam:             See your health care provider every year in order to  o   Review health changes.   o   Discuss preventive care.    o   Review your medicines if your doctor has prescribed any.    You should be tested  each year for STDs (sexually transmitted diseases) if you re at risk.     Have a cholesterol test every 5 years.     Have a colonoscopy (test for colon cancer) if someone in your family has had colon cancer or polyps before age 50.     After age 45, have a diabetes test (fasting glucose). If you are at risk for diabetes, you should have this test every 3 years.      Talk with your health care provider about whether or not a prostate cancer screening test (PSA) is right for you.    Shots: Get a flu shot each year. Get a tetanus shot every 10 years.     Nutrition:    Eat at least 5 servings of fruits and vegetables daily.     Eat whole-grain bread, whole-wheat pasta and brown rice instead of white grains and rice.     Talk to your provider about Calcium and Vitamin D.     Lifestyle    Aerobic exercise for at least 150 minutes a week (40+ minutes per day, 4-6 days per week). This will help you control your weight and prevent disease.      Limit alcohol to one drink per day.     No smoking.     Wear sunscreen to prevent skin cancer.     See your dentist every six months for an exam and cleaning.             High Blood Pressure, To Be Confirmed, No Treatment  Your blood pressure today was higher than normal. Sometimes anxiety or pain can cause a temporary rise in blood pressure. It later returns to normal. Blood pressure that is high only one time doesn t mean that you have high blood pressure (hypertension). High blood pressure is a chronic illness. But you should have your blood pressure measured again within the next few days to find out if it s still high.    A blood pressure reading is made up of two numbers: a higher number over a lower number. The top number is the systolic pressure. The bottom number is the diastolic pressure. A normal blood pressure is a systolic pressure of less than 120 over a diastolic pressure of less than 80. You will see your blood pressure readings written together. For example, a person  with a systolic pressure of 118 and a diastolic pressure of 78 will have 118/78 written in the medical record.     High blood pressure is when either the top number is 140 or higher, or the bottom number is 90 or higher. This must be the result when taking your blood pressure a number of times.   The blood pressures between normal and high are called prehypertension. This is systolic pressure of 120 to 140 or diastolic pressure of 80 to 89. Prehypertension means you are at risk of getting high blood pressure. It's a warning sign. The information gives you a chance to  make lifestyle changes such as weight loss, exercise, and quitting smoking, that can keep your blood pressure from going higher. You should have your blood pressure checked regularly to be sure it isn t rising.  Home care  To track your blood pressure, your provider may ask you to come into the office at different times and on different days. If your healthcare provider asks you to check your readings at home, ask him or her what times of the day to test and for how many days. Before you leave the office, ask your provider to show you how to take your blood pressure and be sure to ask questions if you don't understand something.  Consider buying an automatic blood pressure monitor. Ask your provider for a recommendation. You can buy blood pressure monitors at most pharmacies.  The American Heart Association recommends the following guidelines for home blood pressure monitoring:    Don't smoke or drink coffee for 30 minutes before taking your blood pressure.    Go to the bathroom before the test.    Relax for 5 minutes before taking the measurement.    Sit with your back supported (don't sit on a couch or soft chair); keep your feet on the floor uncrossed. Place your arm on a solid flat surface (like a table) with the upper part of the arm at heart level. Place the middle of the cuff directly above the eye of the elbow. Check the monitor's instruction  manual for an illustration.    Take multiple readings. When you measure, take 2 to 3 readings one minute apart and record all of the results.    Take your blood pressure at the same time every day, or as your healthcare provider recommends.    Record the date, time, and blood pressure reading.    Take the record with you to your next medical appointment. If your blood pressure monitor has a built-in memory, simply take the monitor with you to your next appointment.    Call your provider if you have several high readings. Don't be frightened by a single high blood pressure reading, but if you get several high readings, check in with your healthcare provider.    Note: When blood pressure reaches a systolic (top number) of 180 or higher OR diastolic (bottom number) of 110 or higher, seek emergency medical treatment.  Follow-up care  Keep all of your follow up appointments. If your blood pressure is high (more than 120 over 80) on 2 out of 3 days, you will need to follow up with your healthcare provider for more evaluation and treatment.  Don t put this off! High blood pressure can be treated. High blood pressure that s not treated raises your risk for heart attack and stroke.  When to seek medical advice  Call your healthcare provider right away if any of these occur:    Blood pressure reaches a systolic (top number) of 180 or higher, OR diastolic (bottom number) of 110 or higher    Chest pain or shortness of breath    Severe headache    Throbbing or rushing sound in the ears    Nosebleed    Sudden severe pain in your belly (abdomen)    Extreme drowsiness, confusion, or fainting    Dizziness or dizziness with spinning sensation (vertigo)    Weakness of an arm or leg or one side of the face    You have problems speaking or seeing   Date Last Reviewed: 12/1/2016 2000-2017 [employer]. [address] [facility], [place] [address]. All rights reserved. This information is not intended as a substitute for  professional medical care. Always follow your healthcare professional's instructions.

## 2017-12-12 NOTE — MR AVS SNAPSHOT
After Visit Summary   12/12/2017    Gerardo Simpson    MRN: [id number]           Patient Information     Date Of Birth          1970        Visit Information        Provider Department      12/12/2017 10:40 AM Pavel Hidalgo MD [facility]        Today's Diagnoses     Encounter for routine adult health examination without abnormal findings    -  1    Elevated blood pressure reading without diagnosis of hypertension        CARDIOVASCULAR SCREENING; LDL GOAL LESS THAN 160        Screening examination for pulmonary tuberculosis        Need for prophylactic vaccination and inoculation against influenza          Care Instructions    At [facility], we strive to deliver an exceptional experience to you, every time we see you.  If you receive a survey in the mail, please send us back your thoughts. We really do value your feedback.    Based on your medical history, these are the current health maintenance/preventive care services that you are due for (some may have been done at this visit.)  Health Maintenance Due   Topic Date Due     EYE EXAM Q1 YEAR  08/10/2017     INFLUENZA VACCINE (SYSTEM ASSIGNED)  09/01/2017         Suggested websites for health information:  Www.JumpPost.org : Up to date and easily searchable information on multiple topics.  Www.medlineplus.gov : medication info, interactive tutorials, watch real surgeries online  Www.familydoctor.org : good info from the Academy of Family Physicians  Www.cdc.gov : public health info, travel advisories, epidemics (H1N1)  Www.aap.org : children's health info, normal development, vaccinations  Www.health.state.[place].us : [place] dept of health, public health issues in [place], N1N1    Your care team:                            Family Medicine Internal Medicine   MD Bryant Mac MD Shantel Branch-Fleming, MD Katya Georgiev PA-C Nam Ho, MD Pediatrics   THERESE Vega, BRYANNA  Sienna FRANCOIS CNP   MD Yane Green MD Deborah Mielke, MD Kim Thein, APRN [facility]      Clinic hours: Monday - Thursday 7 am-7 pm; Fridays 7 am-5 pm.   Urgent care: Monday - Friday 11 am-9 pm; Saturday and Sunday 9 am-5 pm.  Pharmacy : Monday -Thursday 8 am-8 pm; Friday 8 am-6 pm; Saturday and Sunday 9 am-5 pm.     Clinic: [phone]   Pharmacy: [phone]      Preventive Health Recommendations  Male Ages 40 to 49    Yearly exam:             See your health care provider every year in order to  o   Review health changes.   o   Discuss preventive care.    o   Review your medicines if your doctor has prescribed any.    You should be tested each year for STDs (sexually transmitted diseases) if you re at risk.     Have a cholesterol test every 5 years.     Have a colonoscopy (test for colon cancer) if someone in your family has had colon cancer or polyps before age 50.     After age 45, have a diabetes test (fasting glucose). If you are at risk for diabetes, you should have this test every 3 years.      Talk with your health care provider about whether or not a prostate cancer screening test (PSA) is right for you.    Shots: Get a flu shot each year. Get a tetanus shot every 10 years.     Nutrition:    Eat at least 5 servings of fruits and vegetables daily.     Eat whole-grain bread, whole-wheat pasta and brown rice instead of white grains and rice.     Talk to your provider about Calcium and Vitamin D.     Lifestyle    Aerobic exercise for at least 150 minutes a week (40+ minutes per day, 4-6 days per week). This will help you control your weight and prevent disease.      Limit alcohol to one drink per day.     No smoking.     Wear sunscreen to prevent skin cancer.     See your dentist every six months for an exam and cleaning.             High Blood Pressure, To Be Confirmed, No Treatment  Your blood pressure today was higher than normal. Sometimes anxiety or pain can cause a  temporary rise in blood pressure. It later returns to normal. Blood pressure that is high only one time doesn t mean that you have high blood pressure (hypertension). High blood pressure is a chronic illness. But you should have your blood pressure measured again within the next few days to find out if it s still high.    A blood pressure reading is made up of two numbers: a higher number over a lower number. The top number is the systolic pressure. The bottom number is the diastolic pressure. A normal blood pressure is a systolic pressure of less than 120 over a diastolic pressure of less than 80. You will see your blood pressure readings written together. For example, a person with a systolic pressure of 118 and a diastolic pressure of 78 will have 118/78 written in the medical record.     High blood pressure is when either the top number is 140 or higher, or the bottom number is 90 or higher. This must be the result when taking your blood pressure a number of times.   The blood pressures between normal and high are called prehypertension. This is systolic pressure of 120 to 140 or diastolic pressure of 80 to 89. Prehypertension means you are at risk of getting high blood pressure. It's a warning sign. The information gives you a chance to  make lifestyle changes such as weight loss, exercise, and quitting smoking, that can keep your blood pressure from going higher. You should have your blood pressure checked regularly to be sure it isn t rising.  Home care  To track your blood pressure, your provider may ask you to come into the office at different times and on different days. If your healthcare provider asks you to check your readings at home, ask him or her what times of the day to test and for how many days. Before you leave the office, ask your provider to show you how to take your blood pressure and be sure to ask questions if you don't understand something.  Consider buying an automatic blood pressure  monitor. Ask your provider for a recommendation. You can buy blood pressure monitors at most pharmacies.  The American Heart Association recommends the following guidelines for home blood pressure monitoring:    Don't smoke or drink coffee for 30 minutes before taking your blood pressure.    Go to the bathroom before the test.    Relax for 5 minutes before taking the measurement.    Sit with your back supported (don't sit on a couch or soft chair); keep your feet on the floor uncrossed. Place your arm on a solid flat surface (like a table) with the upper part of the arm at heart level. Place the middle of the cuff directly above the eye of the elbow. Check the monitor's instruction manual for an illustration.    Take multiple readings. When you measure, take 2 to 3 readings one minute apart and record all of the results.    Take your blood pressure at the same time every day, or as your healthcare provider recommends.    Record the date, time, and blood pressure reading.    Take the record with you to your next medical appointment. If your blood pressure monitor has a built-in memory, simply take the monitor with you to your next appointment.    Call your provider if you have several high readings. Don't be frightened by a single high blood pressure reading, but if you get several high readings, check in with your healthcare provider.    Note: When blood pressure reaches a systolic (top number) of 180 or higher OR diastolic (bottom number) of 110 or higher, seek emergency medical treatment.  Follow-up care  Keep all of your follow up appointments. If your blood pressure is high (more than 120 over 80) on 2 out of 3 days, you will need to follow up with your healthcare provider for more evaluation and treatment.  Don t put this off! High blood pressure can be treated. High blood pressure that s not treated raises your risk for heart attack and stroke.  When to seek medical advice  Call your healthcare provider right  away if any of these occur:    Blood pressure reaches a systolic (top number) of 180 or higher, OR diastolic (bottom number) of 110 or higher    Chest pain or shortness of breath    Severe headache    Throbbing or rushing sound in the ears    Nosebleed    Sudden severe pain in your belly (abdomen)    Extreme drowsiness, confusion, or fainting    Dizziness or dizziness with spinning sensation (vertigo)    Weakness of an arm or leg or one side of the face    You have problems speaking or seeing   Date Last Reviewed: 12/1/2016 2000-2017 [employer]. [address] [place] [address]. All rights reserved. This information is not intended as a substitute for professional medical care. Always follow your healthcare professional's instructions.                Follow-ups after your visit        Follow-up notes from your care team     Return in about 1 week (around 12/19/2017) for blood pressure check.      Who to contact     If you have questions or need follow up information about today's clinic visit or your schedule please contact [facility] directly at [phone].  Normal or non-critical lab and imaging results will be communicated to you by Salonmeisterhart, letter or phone within 4 business days after the clinic has received the results. If you do not hear from us within 7 days, please contact the clinic through Telespreet or phone. If you have a critical or abnormal lab result, we will notify you by phone as soon as possible.  Submit refill requests through Evolva or call your pharmacy and they will forward the refill request to us. Please allow 3 business days for your refill to be completed.          Additional Information About Your Visit        SalonmeisterharNot iT Information     Evolva gives you secure access to your electronic health record. If you see a primary care provider, you can also send messages to your care team and make appointments. If you have questions, please call your  "primary care clinic.  If you do not have a primary care provider, please call [phone] and they will assist you.        Care EveryWhere ID     This is your Care EveryWhere ID. This could be used by other organizations to access your [facility] medical records  [id number]        Your Vitals Were     Pulse Temperature Height Pulse Oximetry BMI (Body Mass Index)       66 97.8  F (36.6  C) (Oral) 1.727 m (5' 8\") 98% 28.59 kg/m2        Blood Pressure from Last 3 Encounters:   12/12/17 156/70   05/08/17 142/82   08/11/16 129/77    Weight from Last 3 Encounters:   12/12/17 85.3 kg (188 lb)   05/08/17 82.6 kg (182 lb)   03/21/17 79.4 kg (175 lb)              We Performed the Following     Basic metabolic panel     HC FLU VAC PRESRV FREE QUAD SPLIT VIR 3+YRS IM     Lipid panel reflex to direct LDL Non-fasting     M Tuberculosis by Quantiferon        Primary Care Provider Office Phone # Fax #    Adilene Del Rio PA-C [phone]       [id number] [facility] [address]        Equal Access to Services     FRANCESCA MANRIQUEZ AH: Hadii aad ku hadasho Soomaali, waaxda luqadaha, qaybta kaalmada adeegyada, waxyonathan davein hayblancan ghazala helm. So [facility] [phone].    ATENCIÓN: Si habla español, tiene a eden disposición servicios gratuitos de asistencia lingüística. Jamaame al [phone].    We comply with applicable federal civil rights laws and [place] laws. We do not discriminate on the basis of race, color, national origin, age, disability, sex, sexual orientation, or gender identity.            Thank you!     Thank you for choosing [facility]  for your care. Our goal is always to provide you with excellent care. Hearing back from our patients is one way we can continue to improve our services. Please take a few minutes to complete the written survey that you may receive in the mail after your visit with us. Thank you!             Your Updated Medication List - Protect others around " you: Learn how to safely use, store and throw away your medicines at www.disposemymeds.org.      Notice  As of 12/12/2017 11:34 AM    You have not been prescribed any medications.

## 2017-12-12 NOTE — NURSING NOTE
"Chief Complaint   Patient presents with     Physical       Initial /76 (BP Location: Left arm, Patient Position: Chair, Cuff Size: Adult Large)  Pulse 66  Temp 97.8  F (36.6  C) (Oral)  Ht 5' 8\" (1.727 m)  Wt 188 lb (85.3 kg)  SpO2 98%  BMI 28.59 kg/m2 Estimated body mass index is 28.59 kg/(m^2) as calculated from the following:    Height as of this encounter: 5' 8\" (1.727 m).    Weight as of this encounter: 188 lb (85.3 kg).  Medication Reconciliation: complete   CONSTANCE Tabares MA      "

## 2017-12-14 LAB
M TB TUBERC IFN-G BLD QL: NEGATIVE
M TB TUBERC IFN-G/MITOGEN IGNF BLD: 0 IU/ML

## 2018-11-02 ENCOUNTER — OFFICE VISIT (OUTPATIENT)
Dept: FAMILY MEDICINE | Facility: CLINIC | Age: 48
End: 2018-11-02
Payer: COMMERCIAL

## 2018-11-02 VITALS
RESPIRATION RATE: 16 BRPM | DIASTOLIC BLOOD PRESSURE: 68 MMHG | WEIGHT: 189 LBS | OXYGEN SATURATION: 99 % | HEIGHT: 68 IN | HEART RATE: 107 BPM | SYSTOLIC BLOOD PRESSURE: 138 MMHG | BODY MASS INDEX: 28.64 KG/M2 | TEMPERATURE: 99.5 F

## 2018-11-02 DIAGNOSIS — Z00.00 ROUTINE HISTORY AND PHYSICAL EXAMINATION OF ADULT: Primary | ICD-10-CM

## 2018-11-02 DIAGNOSIS — Z12.11 SCREEN FOR COLON CANCER: ICD-10-CM

## 2018-11-02 DIAGNOSIS — Z11.4 SCREENING FOR HIV (HUMAN IMMUNODEFICIENCY VIRUS): ICD-10-CM

## 2018-11-02 DIAGNOSIS — Z13.1 SCREENING FOR DIABETES MELLITUS: ICD-10-CM

## 2018-11-02 DIAGNOSIS — Z13.6 CARDIOVASCULAR SCREENING; LDL GOAL LESS THAN 160: ICD-10-CM

## 2018-11-02 DIAGNOSIS — Z12.5 SCREENING FOR PROSTATE CANCER: ICD-10-CM

## 2018-11-02 DIAGNOSIS — Z11.1 SCREENING EXAMINATION FOR PULMONARY TUBERCULOSIS: ICD-10-CM

## 2018-11-02 PROCEDURE — 99396 PREV VISIT EST AGE 40-64: CPT | Performed by: FAMILY MEDICINE

## 2018-11-02 ASSESSMENT — PAIN SCALES - GENERAL: PAINLEVEL: NO PAIN (0)

## 2018-11-02 NOTE — PROGRESS NOTES
SUBJECTIVE:   CC: Gerardo Simpson is an 48 year old male who presents for preventative health visit.     Healthy Habits:    Do you get at least three servings of calcium containing foods daily (dairy, green leafy vegetables, etc.)? yes    Amount of exercise or daily activities, outside of work: 3 day(s) per week    Problems taking medications regularly No    Medication side effects: No    Have you had an eye exam in the past two years? no    Do you see a dentist twice per year? no    Do you have sleep apnea, excessive snoring or daytime drowsiness?no         Today's PHQ-2 Score:   PHQ-2 ( 1999 Pfizer) 11/2/2018 12/12/2017   Q1: Little interest or pleasure in doing things 0 0   Q2: Feeling down, depressed or hopeless 0 0   PHQ-2 Score 0 0       Abuse: Current or Past(Physical, Sexual or Emotional)- No  Do you feel safe in your environment - Yes    Social History   Substance Use Topics     Smoking status: Never Smoker     Smokeless tobacco: Never Used     Alcohol use No      If you drink alcohol do you typically have >3 drinks per day or >7 drinks per week? No                      Last PSA: No results found for: PSA    Reviewed orders with patient. Reviewed health maintenance and updated orders accordingly - Yes  Labs reviewed in EPIC    Reviewed and updated as needed this visit by clinical staff  Tobacco  Allergies  Meds  Med Hx  Surg Hx  Fam Hx  Soc Hx        Reviewed and updated as needed this visit by Provider            ROS:  CONSTITUTIONAL: NEGATIVE for fever, chills, change in weight  INTEGUMENTARY/SKIN: NEGATIVE for worrisome rashes, moles or lesions  EYES: NEGATIVE for vision changes or irritation  ENT: NEGATIVE for ear, mouth and throat problems  RESP: NEGATIVE for significant cough or SOB  CV: NEGATIVE for chest pain, palpitations or peripheral edema  GI: NEGATIVE for nausea, abdominal pain, heartburn, or change in bowel habits   male: negative for dysuria, hematuria, decreased urinary stream,  "erectile dysfunction, urethral discharge  MUSCULOSKELETAL: NEGATIVE for significant arthralgias or myalgia  NEURO: NEGATIVE for weakness, dizziness or paresthesias  PSYCHIATRIC: NEGATIVE for changes in mood or affect    OBJECTIVE:   /72 (BP Location: Left arm, Patient Position: Chair, Cuff Size: Adult Large)  Pulse 107  Temp 99.5  F (37.5  C) (Oral)  Resp 16  Ht 5' 8\" (1.727 m)  Wt 189 lb (85.7 kg)  SpO2 99%  BMI 28.74 kg/m2  EXAM:  GENERAL: healthy, alert and no distress  NECK: no adenopathy, no asymmetry, masses, or scars and thyroid normal to palpation  RESP: lungs clear to auscultation - no rales, rhonchi or wheezes  CV: regular rate and rhythm, normal S1 S2, no S3 or S4, no murmur, click or rub, no peripheral edema and peripheral pulses strong  ABDOMEN: soft, nontender, no hepatosplenomegaly, no masses and bowel sounds normal  MS: no gross musculoskeletal defects noted, no edema    Diagnostic Test Results:  none     ASSESSMENT/PLAN:   1. Routine history and physical examination of adult  As below.    2. CARDIOVASCULAR SCREENING; LDL GOAL LESS THAN 160    - Comprehensive metabolic panel (BMP + Alb, Alk Phos, ALT, AST, Total. Bili, TP); Future  - Lipid panel reflex to direct LDL Fasting; Future    3. Screening for diabetes mellitus    - Comprehensive metabolic panel (BMP + Alb, Alk Phos, ALT, AST, Total. Bili, TP); Future    4. Screen for colon cancer    - GASTROENTEROLOGY ADULT REF PROCEDURE ONLY [facility] [phone]; [facility]    5. Screening for prostate cancer    - PSA, screen; Future    6. Screening for HIV (human immunodeficiency virus)    - HIV Screening; Future    7. Screening examination for pulmonary tuberculosis  For nursing school.  - M Tuberculosis by Quantiferon; Future    COUNSELING:  Reviewed preventive health counseling, as reflected in patient instructions       Regular exercise       Healthy diet/nutrition    BP Readings from Last 1 Encounters:   11/02/18 " "150/72     Estimated body mass index is 28.74 kg/(m^2) as calculated from the following:    Height as of this encounter: 5' 8\" (1.727 m).    Weight as of this encounter: 189 lb (85.7 kg).           reports that he has never smoked. He has never used smokeless tobacco.      Counseling Resources:  ATP IV Guidelines  Pooled Cohorts Equation Calculator  FRAX Risk Assessment  ICSI Preventive Guidelines  Dietary Guidelines for Americans, 2010  USDA's MyPlate  ASA Prophylaxis  Lung CA Screening    Claude Chris MD, MD  [facility]  "

## 2018-11-02 NOTE — PATIENT INSTRUCTIONS
Preventive Health Recommendations  Male Ages 50 - 64    Yearly exam:             See your health care provider every year in order to  o   Review health changes.   o   Discuss preventive care.    o   Review your medicines if your doctor has prescribed any.     Have a cholesterol test every 5 years, or more frequently if you are at risk for high cholesterol/heart disease.     Have a diabetes test (fasting glucose) every three years. If you are at risk for diabetes, you should have this test more often.     Have a colonoscopy at age 50, or have a yearly FIT test (stool test). These exams will check for colon cancer.      Talk with your health care provider about whether or not a prostate cancer screening test (PSA) is right for you.    You should be tested each year for STDs (sexually transmitted diseases), if you re at risk.     Shots: Get a flu shot each year. Get a tetanus shot every 10 years.     Nutrition:    Eat at least 5 servings of fruits and vegetables daily.     Eat whole-grain bread, whole-wheat pasta and brown rice instead of white grains and rice.     Get adequate Calcium and Vitamin D.     Lifestyle    Exercise for at least 150 minutes a week (30 minutes a day, 5 days a week). This will help you control your weight and prevent disease.     Limit alcohol to one drink per day.     No smoking.     Wear sunscreen to prevent skin cancer.     See your dentist every six months for an exam and cleaning.     See your eye doctor every 1 to 2 years.  At [facility], we strive to deliver an exceptional experience to you, every time we see you.  If you receive a survey in the mail, please send us back your thoughts. We really do value your feedback.    Based on your medical history, these are the current health maintenance/preventive care services that you are due for (some may have been done at this visit.)  Health Maintenance Due   Topic Date Due     HIV SCREEN (SYSTEM ASSIGNED)  10/20/1986      EYE EXAM Q1 YEAR  08/10/2017     INFLUENZA VACCINE (1) 09/01/2018     COLON CANCER SCREEN (SYSTEM ASSIGNED)  10/20/2018         Suggested websites for health information:  Www.[facility].org : Up to date and easily searchable information on multiple topics.  Www.medlineplus.gov : medication info, interactive tutorials, watch real surgeries online  Www.familydoctor.org : good info from the Academy of Family Physicians  Www.cdc.gov : public health info, travel advisories, epidemics (H1N1)  Www.aap.org : children's health info, normal development, vaccinations  Www.health.[facility].[place].us : [place] dept of health, public health issues in [place], N1N1    Your care team:                            Family Medicine Internal Medicine   MD Bryant Mac MD Shantel Branch-Fleming, MD Katya Georgiev PA-C Nam Ho, MD Pediatrics   THERESE Vega, MD Yane Durand CNP, MD Deborah Mielke, MD Kim Thein, APRN CNP      Clinic hours: Monday - Thursday 7 am-7 pm; Fridays 7 am-5 pm.   Urgent care: Monday - Friday 11 am-9 pm; Saturday and Sunday 9 am-5 pm.  Pharmacy : Monday -Thursday 8 am-8 pm; Friday 8 am-6 pm; Saturday and Sunday 9 am-5 pm.     Clinic: [phone]   Pharmacy: [phone]

## 2018-11-02 NOTE — MR AVS SNAPSHOT
After Visit Summary   11/2/2018    Gerardo Simpson    MRN: [id number]           Patient Information     Date Of Birth          1970        Visit Information        Provider Department      11/2/2018 11:00 AM Claude Chris MD [facility]        Today's Diagnoses     Routine history and physical examination of adult    -  1    CARDIOVASCULAR SCREENING; LDL GOAL LESS THAN 160        Screening for diabetes mellitus        Screen for colon cancer        Screening for prostate cancer        Screening for HIV (human immunodeficiency virus)        Screening examination for pulmonary tuberculosis          Care Instructions      Preventive Health Recommendations  Male Ages 50 - 64    Yearly exam:             See your health care provider every year in order to  o   Review health changes.   o   Discuss preventive care.    o   Review your medicines if your doctor has prescribed any.     Have a cholesterol test every 5 years, or more frequently if you are at risk for high cholesterol/heart disease.     Have a diabetes test (fasting glucose) every three years. If you are at risk for diabetes, you should have this test more often.     Have a colonoscopy at age 50, or have a yearly FIT test (stool test). These exams will check for colon cancer.      Talk with your health care provider about whether or not a prostate cancer screening test (PSA) is right for you.    You should be tested each year for STDs (sexually transmitted diseases), if you re at risk.     Shots: Get a flu shot each year. Get a tetanus shot every 10 years.     Nutrition:    Eat at least 5 servings of fruits and vegetables daily.     Eat whole-grain bread, whole-wheat pasta and brown rice instead of white grains and rice.     Get adequate Calcium and Vitamin D.     Lifestyle    Exercise for at least 150 minutes a week (30 minutes a day, 5 days a week). This will help you control your weight and prevent disease.     Limit alcohol  to one drink per day.     No smoking.     Wear sunscreen to prevent skin cancer.     See your dentist every six months for an exam and cleaning.     See your eye doctor every 1 to 2 years.  At [facility], we strive to deliver an exceptional experience to you, every time we see you.  If you receive a survey in the mail, please send us back your thoughts. We really do value your feedback.    Based on your medical history, these are the current health maintenance/preventive care services that you are due for (some may have been done at this visit.)  Health Maintenance Due   Topic Date Due     HIV SCREEN (SYSTEM ASSIGNED)  10/20/1986     EYE EXAM Q1 YEAR  08/10/2017     INFLUENZA VACCINE (1) 09/01/2018     COLON CANCER SCREEN (SYSTEM ASSIGNED)  10/20/2018         Suggested websites for health information:  Www.[facility].Via : Up to date and easily searchable information on multiple topics.  Www.[employer].gov : medication info, interactive tutorials, watch real surgeries online  Www.familydoctor.org : good info from the Academy of Family Physicians  Www.cdc.gov : public health info, travel advisories, epidemics (H1N1)  Www.aap.org : children's health info, normal development, vaccinations  Www.health.[facility].mn.us : [place] dept of health, public health issues in [place], N1N1    Your care team:                            Family Medicine Internal Medicine   MD Bryant Mac MD Shantel Branch-Fleming, MD Katya Georgiev PA-C Nam Ho, MD Pediatrics   THERESE Vega, MD Yane Durand CNP, MD Deborah Mielke, MD Kim Thein, APRN [facility]      Clinic hours: Monday - Thursday 7 am-7 pm; Fridays 7 am-5 pm.   Urgent care: Monday - Friday 11 am-9 pm; Saturday and Sunday 9 am-5 pm.  Pharmacy : Monday -Thursday 8 am-8 pm; Friday 8 am-6 pm; Saturday and Sunday 9 am-5 pm.     Clinic: [phone]   Pharmacy: [phone]             Follow-ups after your visit        Additional Services     GASTROENTEROLOGY ADULT REF PROCEDURE ONLY [facility] [phone]; [facility]       Last Lab Result: Creatinine (mg/dL)       Date                     Value                 12/12/2017               0.92             ----------  Body mass index is 28.74 kg/(m^2).     Needed:  No  Language:  English    Patient will be contacted to schedule procedure.     Please be aware that coverage of these services is subject to the terms and limitations of your health insurance plan.  Call member services at your health plan with any benefit or coverage questions.  Any procedures must be performed at a [facility] facility OR coordinated by your clinic's referral office.    Please bring the following with you to your appointment:    (1) Any X-Rays, CTs or MRIs which have been performed.  Contact the facility where they were done to arrange for  prior to your scheduled appointment.    (2) List of current medications   (3) This referral request   (4) Any documents/labs given to you for this referral                  Follow-up notes from your care team     Return in about 1 year (around 11/2/2019) for Physical Exam.      Future tests that were ordered for you today     Open Future Orders        Priority Expected Expires Ordered    HIV Screening Routine  2/2/2019 11/2/2018    Comprehensive metabolic panel (BMP + Alb, Alk Phos, ALT, AST, Total. Bili, TP) Routine  2/2/2019 11/2/2018    Lipid panel reflex to direct LDL Fasting Routine  2/2/2019 11/2/2018    PSA, screen Routine  2/2/2019 11/2/2018    M Tuberculosis by Quantiferon Routine  2/2/2019 11/2/2018            Who to contact     If you have questions or need follow up information about today's clinic visit or your schedule please contact [facility] [place] directly at [phone].  Normal or non-critical lab and imaging results will be communicated to you by Natet, letter  "or phone within 4 business days after the clinic has received the results. If you do not hear from us within 7 days, please contact the clinic through Swarm or phone. If you have a critical or abnormal lab result, we will notify you by phone as soon as possible.  Submit refill requests through Swarm or call your pharmacy and they will forward the refill request to us. Please allow 3 business days for your refill to be completed.          Additional Information About Your Visit        Swarm Information     Swarm gives you secure access to your electronic health record. If you see a primary care provider, you can also send messages to your care team and make appointments. If you have questions, please call your primary care clinic.  If you do not have a primary care provider, please call [phone] and they will assist you.        Care EveryWhere ID     This is your Care EveryWhere ID. This could be used by other organizations to access your [facility] medical records  [id number]        Your Vitals Were     Pulse Temperature Respirations Height Pulse Oximetry BMI (Body Mass Index)    107 99.5  F (37.5  C) (Oral) 16 5' 8\" (1.727 m) 99% 28.74 kg/m2       Blood Pressure from Last 3 Encounters:   11/02/18 138/68   12/12/17 156/70   05/08/17 142/82    Weight from Last 3 Encounters:   11/02/18 189 lb (85.7 kg)   12/12/17 188 lb (85.3 kg)   05/08/17 182 lb (82.6 kg)              We Performed the Following     GASTROENTEROLOGY ADULT REF PROCEDURE ONLY [facility] [phone]; [facility]        Primary Care Provider Office Phone # Fax #    Adilene Maggy Del Rio PA-C [phone] [address] [place] [address]        Equal Access to Services     FRANCESCA MANRIQUEZ AH: Hadii james bentley Somadeline, waaxda luqadaha, qaybta kaalmada adeegyada, chapis helm. So [facility] [phone].    ATENCIÓN: Si habla español, tiene a eden disposición servicios gratuitos de " asistencia lingüística. Nadja al [phone].    We comply with applicable federal civil rights laws and [place] laws. We do not discriminate on the basis of race, color, national origin, age, disability, sex, sexual orientation, or gender identity.            Thank you!     Thank you for choosing [facility]  for your care. Our goal is always to provide you with excellent care. Hearing back from our patients is one way we can continue to improve our services. Please take a few minutes to complete the written survey that you may receive in the mail after your visit with us. Thank you!             Your Updated Medication List - Protect others around you: Learn how to safely use, store and throw away your medicines at www.disposemymeds.org.      Notice  As of 11/2/2018 11:24 AM    You have not been prescribed any medications.

## 2018-11-06 DIAGNOSIS — Z12.5 SCREENING FOR PROSTATE CANCER: ICD-10-CM

## 2018-11-06 DIAGNOSIS — Z11.4 SCREENING FOR HIV (HUMAN IMMUNODEFICIENCY VIRUS): ICD-10-CM

## 2018-11-06 DIAGNOSIS — Z13.6 CARDIOVASCULAR SCREENING; LDL GOAL LESS THAN 160: ICD-10-CM

## 2018-11-06 DIAGNOSIS — Z11.1 SCREENING EXAMINATION FOR PULMONARY TUBERCULOSIS: ICD-10-CM

## 2018-11-06 DIAGNOSIS — Z13.1 SCREENING FOR DIABETES MELLITUS: ICD-10-CM

## 2018-11-06 LAB
ALBUMIN SERPL-MCNC: 3.7 G/DL (ref 3.4–5)
ALP SERPL-CCNC: 60 U/L (ref 40–150)
ALT SERPL W P-5'-P-CCNC: 23 U/L (ref 0–70)
ANION GAP SERPL CALCULATED.3IONS-SCNC: 5 MMOL/L (ref 3–14)
AST SERPL W P-5'-P-CCNC: 19 U/L (ref 0–45)
BILIRUB SERPL-MCNC: 0.3 MG/DL (ref 0.2–1.3)
BUN SERPL-MCNC: 11 MG/DL (ref 7–30)
CALCIUM SERPL-MCNC: 8.9 MG/DL (ref 8.5–10.1)
CHLORIDE SERPL-SCNC: 105 MMOL/L (ref 94–109)
CHOLEST SERPL-MCNC: 175 MG/DL
CO2 SERPL-SCNC: 29 MMOL/L (ref 20–32)
CREAT SERPL-MCNC: 0.8 MG/DL (ref 0.66–1.25)
GFR SERPL CREATININE-BSD FRML MDRD: >90 ML/MIN/1.7M2
GLUCOSE SERPL-MCNC: 121 MG/DL (ref 70–99)
HDLC SERPL-MCNC: 37 MG/DL
LDLC SERPL CALC-MCNC: 107 MG/DL
NONHDLC SERPL-MCNC: 138 MG/DL
POTASSIUM SERPL-SCNC: 3.8 MMOL/L (ref 3.4–5.3)
PROT SERPL-MCNC: 7.7 G/DL (ref 6.8–8.8)
PSA SERPL-ACNC: 1.42 UG/L (ref 0–4)
SODIUM SERPL-SCNC: 139 MMOL/L (ref 133–144)
TRIGL SERPL-MCNC: 155 MG/DL

## 2018-11-06 PROCEDURE — 80061 LIPID PANEL: CPT | Performed by: FAMILY MEDICINE

## 2018-11-06 PROCEDURE — G0103 PSA SCREENING: HCPCS | Performed by: FAMILY MEDICINE

## 2018-11-06 PROCEDURE — 86480 TB TEST CELL IMMUN MEASURE: CPT | Performed by: FAMILY MEDICINE

## 2018-11-06 PROCEDURE — 36415 COLL VENOUS BLD VENIPUNCTURE: CPT | Performed by: FAMILY MEDICINE

## 2018-11-06 PROCEDURE — 80053 COMPREHEN METABOLIC PANEL: CPT | Performed by: FAMILY MEDICINE

## 2018-11-06 PROCEDURE — 87389 HIV-1 AG W/HIV-1&-2 AB AG IA: CPT | Performed by: FAMILY MEDICINE

## 2018-11-06 NOTE — LETTER
November 8, 2018      Gerardo Simpson  [address]  [place] [address]        Dear Gerardo,     Your blood sugar, glucose, was elevated in the prediabetes range. Should try to work on weight loss to help with blood sugar control. Please follow up in 3-6 months for recheck. Your kidney, liver, electrolyte and prostate tests were normal. Your HIV and tuberculosis screening tests were negative.     Sincerely,   Claude Chris MD     Resulted Orders   HIV Screening   Result Value Ref Range    HIV Antigen Antibody Combo Nonreactive NR^Nonreactive          Comment:      HIV-1 p24 Ag & HIV-1/HIV-2 Ab Not Detected   Comprehensive metabolic panel (BMP + Alb, Alk Phos, ALT, AST, Total. Bili, TP)   Result Value Ref Range    Sodium 139 133 - 144 mmol/L    Potassium 3.8 3.4 - 5.3 mmol/L    Chloride 105 94 - 109 mmol/L    Carbon Dioxide 29 20 - 32 mmol/L    Anion Gap 5 3 - 14 mmol/L    Glucose 121 (H) 70 - 99 mg/dL      Comment:      Fasting specimen    Urea Nitrogen 11 7 - 30 mg/dL    Creatinine 0.80 0.66 - 1.25 mg/dL    GFR Estimate >90 >60 mL/min/1.7m2      Comment:      Non  GFR Calc    GFR Estimate If Black >90 >60 mL/min/1.7m2      Comment:       GFR Calc    Calcium 8.9 8.5 - 10.1 mg/dL    Bilirubin Total 0.3 0.2 - 1.3 mg/dL    Albumin 3.7 3.4 - 5.0 g/dL    Protein Total 7.7 6.8 - 8.8 g/dL    Alkaline Phosphatase 60 40 - 150 U/L    ALT 23 0 - 70 U/L    AST 19 0 - 45 U/L   Lipid panel reflex to direct LDL Fasting   Result Value Ref Range    Cholesterol 175 <200 mg/dL    Triglycerides 155 (H) <150 mg/dL      Comment:      Borderline high:  150-199 mg/dl  High:             200-499 mg/dl  Very high:       >499 mg/dl  Fasting specimen      HDL Cholesterol 37 (L) >39 mg/dL    LDL Cholesterol Calculated 107 (H) <100 mg/dL      Comment:      Above desirable:  100-129 mg/dl  Borderline High:  130-159 mg/dL  High:             160-189 mg/dL  Very high:       >189 mg/dl      Non HDL Cholesterol  138 (H) <130 mg/dL      Comment:      Above Desirable:  130-159 mg/dl  Borderline high:  160-189 mg/dl  High:             190-219 mg/dl  Very high:       >219 mg/dl     PSA, screen   Result Value Ref Range    PSA 1.42 0 - 4 ug/L      Comment:      Assay Method:  Chemiluminescence using Siemens Vista analyzer   Quantiferon TB Gold Plus   Result Value Ref Range    Quantiferon-TB Gold Plus Result Negative NEG^Negative      Comment:      No interferon gamma response to M.tuberculosis antigens was detected.   Infection with M.tuberculosis is unlikely, however a single negative result   does not exclude infection. In patients at high risk for infection, a second   test should be considered  in accordance with the 2017 ATS/IDSA/CDC Clinical Practice Guidelines for   Diagnosis of Tuberculosis in Adults and Children [Perryinsohgera BEAN et   al.Clin.Infect.Dis. 2017 64(2):111-115].      TB1 Ag minus Nil Value 0.00 IU/mL    TB2 Ag minus Nil Value 0.00 IU/mL    Mitogen minus Nil Result >10.00 IU/mL    Nil Result 0.02 IU/mL

## 2018-11-07 LAB — HIV 1+2 AB+HIV1 P24 AG SERPL QL IA: NONREACTIVE

## 2018-11-08 LAB
GAMMA INTERFERON BACKGROUND BLD IA-ACNC: 0.02 IU/ML
M TB IFN-G BLD-IMP: NEGATIVE
M TB IFN-G CD4+ BCKGRND COR BLD-ACNC: >10 IU/ML
MITOGEN IGNF BCKGRD COR BLD-ACNC: 0 IU/ML
MITOGEN IGNF BCKGRD COR BLD-ACNC: 0 IU/ML

## 2018-11-15 ENCOUNTER — HOSPITAL ENCOUNTER (OUTPATIENT)
Facility: AMBULATORY SURGERY CENTER | Age: 48
End: 2018-11-15
Attending: SURGERY | Admitting: SURGERY
Payer: COMMERCIAL

## 2018-11-15 ENCOUNTER — TELEPHONE (OUTPATIENT)
Dept: SURGERY | Facility: CLINIC | Age: 48
End: 2018-11-15

## 2018-11-15 NOTE — TELEPHONE ENCOUNTER
Location: [facility]  The name of the procedure: Colonoscopy  Provider scheduled with: Dr. Tamayo  Date 12/28, Time 1130  Pharmacy [facility]

## 2018-12-14 ENCOUNTER — TELEPHONE (OUTPATIENT)
Dept: SURGERY | Facility: CLINIC | Age: 48
End: 2018-12-14

## 2019-04-23 ENCOUNTER — OFFICE VISIT (OUTPATIENT)
Dept: OTOLARYNGOLOGY | Facility: CLINIC | Age: 49
End: 2019-04-23
Payer: COMMERCIAL

## 2019-04-23 VITALS
HEART RATE: 96 BPM | BODY MASS INDEX: 28.79 KG/M2 | DIASTOLIC BLOOD PRESSURE: 70 MMHG | RESPIRATION RATE: 12 BRPM | OXYGEN SATURATION: 99 % | SYSTOLIC BLOOD PRESSURE: 135 MMHG | HEIGHT: 68 IN | WEIGHT: 190 LBS

## 2019-04-23 DIAGNOSIS — H61.23 BILATERAL IMPACTED CERUMEN: Primary | ICD-10-CM

## 2019-04-23 PROCEDURE — 69210 REMOVE IMPACTED EAR WAX UNI: CPT | Performed by: OTOLARYNGOLOGY

## 2019-04-23 PROCEDURE — 99213 OFFICE O/P EST LOW 20 MIN: CPT | Mod: 25 | Performed by: OTOLARYNGOLOGY

## 2019-04-23 ASSESSMENT — MIFFLIN-ST. JEOR: SCORE: 1696.33

## 2019-04-23 NOTE — PATIENT INSTRUCTIONS
Scheduling Information  To schedule your CT/MRI scan, please contact [facility] at [phone] OR [facility] at [phone]    To schedule your Surgery, please contact our Specialty Schedulers at [phone]      ENT Clinic Locations Clinic Hours Telephone Number     [facility]  [address] [place] [address]   Monday:           1:00pm -- 5:00pm    Friday:              8:00am - 12:00pm   To schedule/reschedule an appointment with   Dr. Bangura,   please contact our   Specialty Scheduling Department at:     [phone]       [facility]  [address] [place] [address] Tuesday:          8:00am -- 2:00pm         Urgent Care Locations Clinic Hours Telephone Numbers     [facility]  [address] [place] [address]     Monday-Friday:     11:00am - 9:00pm    Saturday-Sunday:  9:00am - 5:00pm   [phone]     [facility]  [address] [place] [address]     Monday-Friday:      5:00pm - 9:00pm     Saturday-Sunday:  9:00am - 5:00pm   [phone]

## 2019-04-23 NOTE — LETTER
4/23/2019         RE: Gerardo Simpson  [address]  [facility] [address]        Dear Colleague,    Thank you for referring your patient, Gerardo Simpson, to the [facility]. Please see a copy of my visit note below.    History of Present Illness - Gerardo Simpson is a very nice 48 year old male here in follow up from the last visit on 3/21/2017    To review, he has history of ear problems.  About 20 years ago he was told by a doctor that the RIGHT ear needed a medication to help, because the RIGHT ear was not hearing well. After that everything seemed great, no problems at all until now.  He is not sure if there was infection or not.  But after exam, I found dramatic cerumen impaction, but more unusually his cerumen had the atypical consistency of thick sludge, like thick peanut butter.  And after thorough debridement, his audiogram was normal.    Therefore my recommendation was ear checks and debridement every 6 months.  But unfortunately he was lost to follow up until now.  His ears are full again.  No pain, no purulence, no bleeding from the ears.    Past Medical History -   Patient Active Problem List   Diagnosis     CARDIOVASCULAR SCREENING; LDL GOAL LESS THAN 160     Overweight (BMI 25.0-29.9)     Elevated blood pressure reading without diagnosis of hypertension     Chronic constipation     Pinguecula of both eyes     Bilateral impacted cerumen       Current Medications - No current outpatient medications on file.    Allergies - No Known Allergies    Social History -   Social History     Social History     Marital status:      Spouse name: N/A     Number of children: 2     Years of education: N/A     Occupational History      [employer]     Social History Main Topics     Smoking status: Never Smoker     Smokeless tobacco: Never Used     Alcohol use No     Drug use: No     Sexual activity: Yes     Partners: Female      Comment:      Other Topics Concern      "Not on file     Social History Narrative    From [facility], in  2012       Family History -   Family History   Problem Relation Age of Onset     Hypertension Father      Diabetes Mother      Breast Cancer Mother      Diabetes Maternal Uncle      C.A.D. No family hx of      Cerebrovascular Disease No family hx of      Thyroid Disease No family hx of      Glaucoma No family hx of      Macular Degeneration No family hx of        Review of Systems - As per HPI and PMHx, otherwise 10+ system review of the head and neck, and general constitution is negative.    Physical Exam  B/P: Data Unavailable, T: Data Unavailable, P: Data Unavailable, R: Data Unavailable  Vitals: /70   Pulse 96   Resp 12   Ht 1.727 m (5' 8\")   Wt 86.2 kg (190 lb)   SpO2 99%   BMI 28.89 kg/m     BMI= Body mass index is 28.89 kg/m .    General - The patient is well nourished and well developed, and appears to have good nutritional status.  Alert and oriented to person and place, answers questions and cooperates with examination appropriately.   Head and Face - Normocephalic and atraumatic, with no gross asymmetry noted of the contour of the facial features.  The facial nerve is intact, with strong symmetric movements.  Voice and Breathing - The patient was breathing comfortably without the use of accessory muscles. There was no wheezing, stridor, or stertor.  The patients voice was clear and strong, and had appropriate pitch and quality.  Eyes - Extraocular movements intact, and the pupils were reactive to light.  Sclera were not icteric or injected, conjunctiva were pink and moist.  Mouth - Examination of the oral cavity showed pink, healthy oral mucosa. No lesions or ulcerations noted.  The tongue was mobile and midline, and the dentition were in good condition.    Throat - The walls of the oropharynx were smooth, pink, moist, symmetric, and had no lesions or ulcerations.  The tonsillar pillars and soft palate were symmetric.  The uvula " was midline on elevation.    Neck - Normal midline excursion of the laryngotracheal complex during swallowing.  Full range of motion on passive movement.  Palpation of the occipital, submental, submandibular, internal jugular chain, and supraclavicular nodes did not demonstrate any abnormal lymph nodes or masses.      Cerumen Removal    Physical Exam and Procedure  Ears - On examination of the ears, I found that the BOTH ears were completely impacted with dense cerumen, and just as before it is tenaciously thick and sticky like peanut butter.  Therefore, I positioned them in the examination chair in a semi-supine position, beginning with the right side.  I used the binocular surgical microscope to perform cerumen removal.  I began by using a cerumen loop to gently lift the edges of the cerumen mass away from the walls of the external canal.  Once I did this, I was able to suction away fragments of wax and debris using suction.  Once the mass was loose enough, the entire plug was pulled from the canal.  The tympanic membrane was intact, no sign of perforation or middle ear effusion.    I turned my attention to the contralateral side once again using the binocular surgical microscope to perform cerumen removal.  However, as I got close to the tympanic membrane, the hard thick cerumen was wrapped tightly in squamous rind and packed tightly into the anterior fornix.  I tried to rotate and pull this out, but it was too painful.      A/P - Gerardo Simpson is a 46 year old male  (H61.23) Bilateral impacted cerumen  (primary encounter diagnosis)  (H93.8X3) Sensation of fullness in both ears  Comment:    I was able to clear the RIGHT, but the LEFT was so severely impacted into the anterior fornix, he could not tolerate the pain.     I will place him on debrox for a week and bring him and finish the job.    Again, thank you for allowing me to participate in the care of your patient.        Sincerely,        Jason Gates  MD Sveta

## 2019-04-23 NOTE — PROGRESS NOTES
History of Present Illness - Gerardo Simpson is a very nice 48 year old male here in follow up from the last visit on 3/21/2017    To review, he has history of ear problems.  About 20 years ago he was told by a doctor that the RIGHT ear needed a medication to help, because the RIGHT ear was not hearing well. After that everything seemed great, no problems at all until now.  He is not sure if there was infection or not.  But after exam, I found dramatic cerumen impaction, but more unusually his cerumen had the atypical consistency of thick sludge, like thick peanut butter.  And after thorough debridement, his audiogram was normal.    Therefore my recommendation was ear checks and debridement every 6 months.  But unfortunately he was lost to follow up until now.  His ears are full again.  No pain, no purulence, no bleeding from the ears.    Past Medical History -   Patient Active Problem List   Diagnosis     CARDIOVASCULAR SCREENING; LDL GOAL LESS THAN 160     Overweight (BMI 25.0-29.9)     Elevated blood pressure reading without diagnosis of hypertension     Chronic constipation     Pinguecula of both eyes     Bilateral impacted cerumen       Current Medications - No current outpatient medications on file.    Allergies - No Known Allergies    Social History -   Social History     Social History     Marital status:      Spouse name: N/A     Number of children: 2     Years of education: N/A     Occupational History      [employer]     Social History Main Topics     Smoking status: Never Smoker     Smokeless tobacco: Never Used     Alcohol use No     Drug use: No     Sexual activity: Yes     Partners: Female      Comment:      Other Topics Concern     Not on file     Social History Narrative    From [place], in  2012       Family History -   Family History   Problem Relation Age of Onset     Hypertension Father      Diabetes Mother      Breast Cancer Mother      Diabetes Maternal Uncle      C.A.D.  "No family hx of      Cerebrovascular Disease No family hx of      Thyroid Disease No family hx of      Glaucoma No family hx of      Macular Degeneration No family hx of        Review of Systems - As per HPI and PMHx, otherwise 10+ system review of the head and neck, and general constitution is negative.    Physical Exam  B/P: Data Unavailable, T: Data Unavailable, P: Data Unavailable, R: Data Unavailable  Vitals: /70   Pulse 96   Resp 12   Ht 1.727 m (5' 8\")   Wt 86.2 kg (190 lb)   SpO2 99%   BMI 28.89 kg/m    BMI= Body mass index is 28.89 kg/m .    General - The patient is well nourished and well developed, and appears to have good nutritional status.  Alert and oriented to person and place, answers questions and cooperates with examination appropriately.   Head and Face - Normocephalic and atraumatic, with no gross asymmetry noted of the contour of the facial features.  The facial nerve is intact, with strong symmetric movements.  Voice and Breathing - The patient was breathing comfortably without the use of accessory muscles. There was no wheezing, stridor, or stertor.  The patients voice was clear and strong, and had appropriate pitch and quality.  Eyes - Extraocular movements intact, and the pupils were reactive to light.  Sclera were not icteric or injected, conjunctiva were pink and moist.  Mouth - Examination of the oral cavity showed pink, healthy oral mucosa. No lesions or ulcerations noted.  The tongue was mobile and midline, and the dentition were in good condition.    Throat - The walls of the oropharynx were smooth, pink, moist, symmetric, and had no lesions or ulcerations.  The tonsillar pillars and soft palate were symmetric.  The uvula was midline on elevation.    Neck - Normal midline excursion of the laryngotracheal complex during swallowing.  Full range of motion on passive movement.  Palpation of the occipital, submental, submandibular, internal jugular chain, and supraclavicular " nodes did not demonstrate any abnormal lymph nodes or masses.      Cerumen Removal    Physical Exam and Procedure  Ears - On examination of the ears, I found that the BOTH ears were completely impacted with dense cerumen, and just as before it is tenaciously thick and sticky like peanut butter.  Therefore, I positioned them in the examination chair in a semi-supine position, beginning with the right side.  I used the binocular surgical microscope to perform cerumen removal.  I began by using a cerumen loop to gently lift the edges of the cerumen mass away from the walls of the external canal.  Once I did this, I was able to suction away fragments of wax and debris using suction.  Once the mass was loose enough, the entire plug was pulled from the canal.  The tympanic membrane was intact, no sign of perforation or middle ear effusion.    I turned my attention to the contralateral side once again using the binocular surgical microscope to perform cerumen removal.  However, as I got close to the tympanic membrane, the hard thick cerumen was wrapped tightly in squamous rind and packed tightly into the anterior fornix.  I tried to rotate and pull this out, but it was too painful.      A/P - Gerardo Simpson is a 46 year old male  (H61.23) Bilateral impacted cerumen  (primary encounter diagnosis)  (H93.8X3) Sensation of fullness in both ears  Comment:    I was able to clear the RIGHT, but the LEFT was so severely impacted into the anterior fornix, he could not tolerate the pain.     I will place him on debrox for a week and bring him and finish the job.

## 2019-04-30 ENCOUNTER — OFFICE VISIT (OUTPATIENT)
Dept: OTOLARYNGOLOGY | Facility: CLINIC | Age: 49
End: 2019-04-30
Payer: COMMERCIAL

## 2019-04-30 VITALS
DIASTOLIC BLOOD PRESSURE: 75 MMHG | RESPIRATION RATE: 12 BRPM | WEIGHT: 190 LBS | HEIGHT: 68 IN | SYSTOLIC BLOOD PRESSURE: 130 MMHG | BODY MASS INDEX: 28.79 KG/M2 | OXYGEN SATURATION: 99 %

## 2019-04-30 DIAGNOSIS — H61.22 IMPACTED CERUMEN OF LEFT EAR: ICD-10-CM

## 2019-04-30 DIAGNOSIS — H61.23 BILATERAL IMPACTED CERUMEN: Primary | ICD-10-CM

## 2019-04-30 PROCEDURE — 69210 REMOVE IMPACTED EAR WAX UNI: CPT | Mod: LT | Performed by: OTOLARYNGOLOGY

## 2019-04-30 PROCEDURE — 99207 ZZC DROP WITH A PROCEDURE: CPT | Performed by: OTOLARYNGOLOGY

## 2019-04-30 ASSESSMENT — MIFFLIN-ST. JEOR: SCORE: 1696.33

## 2019-04-30 NOTE — LETTER
4/30/2019         RE: Gerardo Simpson  [address]  [facility] [address]        Dear Colleague,    Thank you for referring your patient, Gerardo Simpson, to the [facility]. Please see a copy of my visit note below.    History of Present Illness - Gerardo Simpson is a 48 year old male here for a pre planned procedure visit.  I saw him last week and was only able to clear the RIGHT side of cerumen, the LEFT was too deeply impacted and painful    I had him use drops for a week, and he is here to complete the LEFT side.    Past medical history -   Patient Active Problem List   Diagnosis     CARDIOVASCULAR SCREENING; LDL GOAL LESS THAN 160     Overweight (BMI 25.0-29.9)     Elevated blood pressure reading without diagnosis of hypertension     Chronic constipation     Pinguecula of both eyes     Bilateral impacted cerumen       B/P: Data Unavailable, Temperature: Data Unavailable, Pulse: Data Unavailable, Respirations: Data Unavailable  Vitals: There were no vitals taken for this visit.  BMI= There is no height or weight on file to calculate BMI.    General - The patient is well nourished and well developed, and appears to have good nutritional status.  Alert and oriented to person and place, answers questions and cooperates with examination appropriately.   Head and Face - Normocephalic and atraumatic, with no gross asymmetry noted of the contour of the facial features.  The facial nerve is intact, with strong symmetric movements.    LEFT Cerumen Removal    Physical Exam and Procedure  Ears -  I positioned them in the examination chair in a semi-supine position, beginning with the LEFT side.  I used the binocular surgical microscope to perform cerumen removal.  I began by using a cerumen loop to gently lift the edges of the cerumen mass away from the walls of the external canal.  Once I did this, I was able to suction away fragments of wax and debris using suction.  Once the mass was  loose enough, the entire plug was pulled from the canal.  The tympanic membrane was intact, no sign of perforation or middle ear effusion.      A/P - Gerardo Simpson  (H61.23) Bilateral impacted cerumen  (primary encounter diagnosis)  The patient has had their cerumen procedurally removed today.  I have discussed ear care at home, including avoiding qtips or any other object placed in the canal.  I have also discussed that over the counter cerumen kits like Debrox or Cerumenex can be useful.    If no other issues, follow up with me in one year for an ear check.      Again, thank you for allowing me to participate in the care of your patient.        Sincerely,        Jason Bangura MD

## 2019-04-30 NOTE — PROGRESS NOTES
History of Present Illness - Gerardo Simpson is a 48 year old male here for a pre planned procedure visit.  I saw him last week and was only able to clear the RIGHT side of cerumen, the LEFT was too deeply impacted and painful    I had him use drops for a week, and he is here to complete the LEFT side.    Past medical history -   Patient Active Problem List   Diagnosis     CARDIOVASCULAR SCREENING; LDL GOAL LESS THAN 160     Overweight (BMI 25.0-29.9)     Elevated blood pressure reading without diagnosis of hypertension     Chronic constipation     Pinguecula of both eyes     Bilateral impacted cerumen       B/P: Data Unavailable, Temperature: Data Unavailable, Pulse: Data Unavailable, Respirations: Data Unavailable  Vitals: There were no vitals taken for this visit.  BMI= There is no height or weight on file to calculate BMI.    General - The patient is well nourished and well developed, and appears to have good nutritional status.  Alert and oriented to person and place, answers questions and cooperates with examination appropriately.   Head and Face - Normocephalic and atraumatic, with no gross asymmetry noted of the contour of the facial features.  The facial nerve is intact, with strong symmetric movements.    LEFT Cerumen Removal    Physical Exam and Procedure  Ears -  I positioned them in the examination chair in a semi-supine position, beginning with the LEFT side.  I used the binocular surgical microscope to perform cerumen removal.  I began by using a cerumen loop to gently lift the edges of the cerumen mass away from the walls of the external canal.  Once I did this, I was able to suction away fragments of wax and debris using suction.  Once the mass was loose enough, the entire plug was pulled from the canal.  The tympanic membrane was intact, no sign of perforation or middle ear effusion.      A/P - Gerardo Simpson  (H61.23) Bilateral impacted cerumen  (primary encounter diagnosis)  The patient has had  their cerumen procedurally removed today.  I have discussed ear care at home, including avoiding qtips or any other object placed in the canal.  I have also discussed that over the counter cerumen kits like Debrox or Cerumenex can be useful.    If no other issues, follow up with me in one year for an ear check.

## 2019-04-30 NOTE — PATIENT INSTRUCTIONS
Scheduling Information  To schedule your CT/MRI scan, please contact [facility] at [phone] OR [facility] at [phone]    To schedule your Surgery, please contact our Specialty Schedulers at [phone]      ENT Clinic Locations Clinic Hours Telephone Number     [facility]  [address] [place] [address]   Monday:           1:00pm -- 5:00pm    Friday:              8:00am - 12:00pm   To schedule/reschedule an appointment with   Dr. Bangura,   please contact our   Specialty Scheduling Department at:     [phone]       [facility]  [address] [place] [address] Tuesday:          8:00am -- 2:00pm         Urgent Care Locations Clinic Hours Telephone Numbers     [facility]  [address] [place] [address]     Monday-Friday:     11:00am - 9:00pm    Saturday-Sunday:  9:00am - 5:00pm   [phone]     [facility]  [address] [place] [address]     Monday-Friday:      5:00pm - 9:00pm     Saturday-Sunday:  9:00am - 5:00pm   [phone]

## 2020-03-02 ENCOUNTER — HEALTH MAINTENANCE LETTER (OUTPATIENT)
Age: 50
End: 2020-03-02

## 2020-03-10 ENCOUNTER — OFFICE VISIT (OUTPATIENT)
Dept: OTOLARYNGOLOGY | Facility: CLINIC | Age: 50
End: 2020-03-10
Payer: COMMERCIAL

## 2020-03-10 VITALS
HEIGHT: 68 IN | HEART RATE: 66 BPM | OXYGEN SATURATION: 98 % | WEIGHT: 190 LBS | DIASTOLIC BLOOD PRESSURE: 90 MMHG | SYSTOLIC BLOOD PRESSURE: 151 MMHG | BODY MASS INDEX: 28.79 KG/M2

## 2020-03-10 DIAGNOSIS — H61.23 BILATERAL IMPACTED CERUMEN: Primary | ICD-10-CM

## 2020-03-10 PROCEDURE — 69210 REMOVE IMPACTED EAR WAX UNI: CPT | Mod: 50 | Performed by: OTOLARYNGOLOGY

## 2020-03-10 ASSESSMENT — MIFFLIN-ST. JEOR: SCORE: 1691.33

## 2020-03-10 NOTE — PROGRESS NOTES
"History of Present Illness - Gerardo Simpson is a 49 year old male last seen on 4/30/2019, when we had to procedurally disimpact the ears.  The RIGHT side was so difficult I had to place him on softening drops and see him back a second time, where we were successful.    He again returns due to the same fullness and muffled hearing.  Otherwise no pain, no blood or purulence coming from the ears, no vertigo, no periauricular pain.    Past medical history -   Patient Active Problem List   Diagnosis     CARDIOVASCULAR SCREENING; LDL GOAL LESS THAN 160     Overweight (BMI 25.0-29.9)     Elevated blood pressure reading without diagnosis of hypertension     Chronic constipation     Pinguecula of both eyes     Bilateral impacted cerumen       BP (!) 151/90 (BP Location: Left arm, Patient Position: Sitting, Cuff Size: Adult Regular)   Pulse 66   Ht 1.727 m (5' 8\")   Wt 86.2 kg (190 lb)   SpO2 98%   BMI 28.89 kg/m      General - The patient is well nourished and well developed, and appears to have good nutritional status.  Alert and oriented to person and place, answers questions and cooperates with examination appropriately.   Head and Face - Normocephalic and atraumatic, with no gross asymmetry noted of the contour of the facial features.  The facial nerve is intact, with strong symmetric movements.  Eyes - Extraocular movements intact, and the pupils were reactive to light.  Sclera were not icteric or injected, conjunctiva were pink and moist.  Mouth - Examination of the oral cavity shows pink, healthy, moist mucosa.  No lesions or ulceration noted.  The dentition are in good repair.  The tongue is mobile and midline.    Cerumen Removal    Physical Exam and Procedure  Ears - On examination of the ears, I found that the BOTH sides had cerumen impaction.  Therefore, I positioned them in the examination chair in a semi-supine position, beginning with the right side.  I used the binocular surgical microscope to perform " cerumen removal.  I began by using a cerumen loop to gently lift the edges of the cerumen mass away from the walls of the external canal.  Once I did this, I was able to suction away fragments of wax and debris using suction.  Once the mass was loose enough, the entire plug was pulled from the canal.  The tympanic membrane was intact, no sign of perforation or middle ear effusion.    I turned my attention to the contralateral side once again using the binocular surgical microscope to perform cerumen removal.  I began by using a cerumen loop to gently lift the edges of the cerumen mass away from the walls of the external canal.  Once I did this, I was able to suction away fragments of wax and debris using suction.  However, once I was deep in the canal, the cerumen was so hard and wedged into the anterior fornix that the pain was intolerable and we had to abandon the LEFT cleaning.        A/P - Gerardo Simpson  (H61.23) Bilateral impacted cerumen  (primary encounter diagnosis)    The patient has had their cerumen procedurally removed today but I was not able to finsih the LEFT side.    I will place him on drops and see him back to complete the LEFT side.

## 2020-03-10 NOTE — LETTER
"    3/10/2020         RE: Gerardo Simpson  [address]  [place] [address]        Dear Colleague,    Thank you for referring your patient, Gerardo Simpson, to the [facility]. Please see a copy of my visit note below.    History of Present Illness - Gerardo Simpson is a 49 year old male last seen on 4/30/2019, when we had to procedurally disimpact the ears.  The RIGHT side was so difficult I had to place him on softening drops and see him back a second time, where we were successful.    He again returns due to the same fullness and muffled hearing.  Otherwise no pain, no blood or purulence coming from the ears, no vertigo, no periauricular pain.    Past medical history -   Patient Active Problem List   Diagnosis     CARDIOVASCULAR SCREENING; LDL GOAL LESS THAN 160     Overweight (BMI 25.0-29.9)     Elevated blood pressure reading without diagnosis of hypertension     Chronic constipation     Pinguecula of both eyes     Bilateral impacted cerumen       BP (!) 151/90 (BP Location: Left arm, Patient Position: Sitting, Cuff Size: Adult Regular)   Pulse 66   Ht 1.727 m (5' 8\")   Wt 86.2 kg (190 lb)   SpO2 98%   BMI 28.89 kg/m      General - The patient is well nourished and well developed, and appears to have good nutritional status.  Alert and oriented to person and place, answers questions and cooperates with examination appropriately.   Head and Face - Normocephalic and atraumatic, with no gross asymmetry noted of the contour of the facial features.  The facial nerve is intact, with strong symmetric movements.  Eyes - Extraocular movements intact, and the pupils were reactive to light.  Sclera were not icteric or injected, conjunctiva were pink and moist.  Mouth - Examination of the oral cavity shows pink, healthy, moist mucosa.  No lesions or ulceration noted.  The dentition are in good repair.  The tongue is mobile and midline.    Cerumen Removal    Physical Exam and " Procedure  Ears - On examination of the ears, I found that the BOTH sides had cerumen impaction.  Therefore, I positioned them in the examination chair in a semi-supine position, beginning with the right side.  I used the binocular surgical microscope to perform cerumen removal.  I began by using a cerumen loop to gently lift the edges of the cerumen mass away from the walls of the external canal.  Once I did this, I was able to suction away fragments of wax and debris using suction.  Once the mass was loose enough, the entire plug was pulled from the canal.  The tympanic membrane was intact, no sign of perforation or middle ear effusion.    I turned my attention to the contralateral side once again using the binocular surgical microscope to perform cerumen removal.  I began by using a cerumen loop to gently lift the edges of the cerumen mass away from the walls of the external canal.  Once I did this, I was able to suction away fragments of wax and debris using suction.  However, once I was deep in the canal, the cerumen was so hard and wedged into the anterior fornix that the pain was intolerable and we had to abandon the LEFT cleaning.        A/P - Gerardo Simpson  (H61.23) Bilateral impacted cerumen  (primary encounter diagnosis)    The patient has had their cerumen procedurally removed today but I was not able to finsih the LEFT side.    I will place him on drops and see him back to complete the LEFT side.      Again, thank you for allowing me to participate in the care of your patient.        Sincerely,        Jason Bangura MD

## 2020-07-27 ENCOUNTER — OFFICE VISIT (OUTPATIENT)
Dept: OTOLARYNGOLOGY | Facility: CLINIC | Age: 50
End: 2020-07-27
Payer: COMMERCIAL

## 2020-07-27 VITALS
SYSTOLIC BLOOD PRESSURE: 130 MMHG | DIASTOLIC BLOOD PRESSURE: 74 MMHG | OXYGEN SATURATION: 95 % | HEART RATE: 77 BPM | TEMPERATURE: 97.8 F

## 2020-07-27 DIAGNOSIS — H61.23 BILATERAL IMPACTED CERUMEN: Primary | ICD-10-CM

## 2020-07-27 PROCEDURE — 99207 ZZC NO CHARGE LOS: CPT | Mod: 25 | Performed by: OTOLARYNGOLOGY

## 2020-07-27 PROCEDURE — 69210 REMOVE IMPACTED EAR WAX UNI: CPT | Performed by: OTOLARYNGOLOGY

## 2020-07-27 NOTE — PROGRESS NOTES
History of Present Illness - Gerardo Simpson is a 49 year old male last seen on 4/30/2019, when we had to procedurally disimpact the ears.  The RIGHT side was so difficult I had to place him on softening drops and see him back a second time, where we were successful.    He again returned on 3/10/2020 due to the same fullness and muffled hearing.  Otherwise no pain, no blood or purulence coming from the ears, no vertigo, no periauricular pain.    I was able to clear the RIGHT, but the LEFT was so impacted into the anterior fornix we had to abandon and place him drops.  He was supposed to have close follow up to complete the procedure, but due to Covid lockdown, he has not been seen until now.    Past medical history -   Patient Active Problem List   Diagnosis     CARDIOVASCULAR SCREENING; LDL GOAL LESS THAN 160     Overweight (BMI 25.0-29.9)     Elevated blood pressure reading without diagnosis of hypertension     Chronic constipation     Pinguecula of both eyes     Bilateral impacted cerumen       /74   Pulse 77   Temp 97.8  F (36.6  C)   SpO2 95%     General - The patient is well nourished and well developed, and appears to have good nutritional status.  Alert and oriented to person and place, answers questions and cooperates with examination appropriately.   Head and Face - Normocephalic and atraumatic, with no gross asymmetry noted of the contour of the facial features.  The facial nerve is intact, with strong symmetric movements.  Eyes - Extraocular movements intact, and the pupils were reactive to light.  Sclera were not icteric or injected, conjunctiva were pink and moist.  Mouth - Examination of the oral cavity shows pink, healthy, moist mucosa.  No lesions or ulceration noted.  The dentition are in good repair.  The tongue is mobile and midline.    Cerumen Removal    Physical Exam and Procedure  Ears - On examination of the ears, I found that the BOTH sides had cerumen impaction.  Therefore, I  positioned them in the examination chair in a semi-supine position, beginning with the right side.  I used the binocular surgical microscope to perform cerumen removal.  I began by using a cerumen loop to gently lift the edges of the cerumen mass away from the walls of the external canal.  Once I did this, I was able to suction away fragments of wax and debris using suction.  Once the mass was loose enough, the entire plug was pulled from the canal.  The tympanic membrane was intact, no sign of perforation or middle ear effusion.    I turned my attention to the contralateral side once again using the binocular surgical microscope to perform cerumen removal.  I began by using a cerumen loop to gently lift the edges of the cerumen mass away from the walls of the external canal.  Once I did this, I was able to suction away fragments of wax and debris using suction.  However, once I was deep in the canal, the cerumen was so hard and wedged into the anterior fornix that the pain was intolerable and we had to abandon the LEFT cleaning.        A/P - Gerardo Simpson  (H61.23) Bilateral impacted cerumen  (primary encounter diagnosis)    The patient has had their cerumen procedurally removed today and I was able to complete the LEFT this time.  His cerumenosis is dramatic, I will see him in 3 months for another ear check and debridement.

## 2020-07-27 NOTE — LETTER
7/27/2020         RE: Gerardo Simpson  [address]  [place] [address]        Dear Colleague,    Thank you for referring your patient, Gerardo Simpson, to the [facility]. Please see a copy of my visit note below.    History of Present Illness - Gerardo Simpson is a 49 year old male last seen on 4/30/2019, when we had to procedurally disimpact the ears.  The RIGHT side was so difficult I had to place him on softening drops and see him back a second time, where we were successful.    He again returned on 3/10/2020 due to the same fullness and muffled hearing.  Otherwise no pain, no blood or purulence coming from the ears, no vertigo, no periauricular pain.    I was able to clear the RIGHT, but the LEFT was so impacted into the anterior fornix we had to abandon and place him drops.  He was supposed to have close follow up to complete the procedure, but due to Covid lockdown, he has not been seen until now.    Past medical history -   Patient Active Problem List   Diagnosis     CARDIOVASCULAR SCREENING; LDL GOAL LESS THAN 160     Overweight (BMI 25.0-29.9)     Elevated blood pressure reading without diagnosis of hypertension     Chronic constipation     Pinguecula of both eyes     Bilateral impacted cerumen       /74   Pulse 77   Temp 97.8  F (36.6  C)   SpO2 95%     General - The patient is well nourished and well developed, and appears to have good nutritional status.  Alert and oriented to person and place, answers questions and cooperates with examination appropriately.   Head and Face - Normocephalic and atraumatic, with no gross asymmetry noted of the contour of the facial features.  The facial nerve is intact, with strong symmetric movements.  Eyes - Extraocular movements intact, and the pupils were reactive to light.  Sclera were not icteric or injected, conjunctiva were pink and moist.  Mouth - Examination of the oral cavity shows pink, healthy, moist mucosa.  No lesions  or ulceration noted.  The dentition are in good repair.  The tongue is mobile and midline.    Cerumen Removal    Physical Exam and Procedure  Ears - On examination of the ears, I found that the BOTH sides had cerumen impaction.  Therefore, I positioned them in the examination chair in a semi-supine position, beginning with the right side.  I used the binocular surgical microscope to perform cerumen removal.  I began by using a cerumen loop to gently lift the edges of the cerumen mass away from the walls of the external canal.  Once I did this, I was able to suction away fragments of wax and debris using suction.  Once the mass was loose enough, the entire plug was pulled from the canal.  The tympanic membrane was intact, no sign of perforation or middle ear effusion.    I turned my attention to the contralateral side once again using the binocular surgical microscope to perform cerumen removal.  I began by using a cerumen loop to gently lift the edges of the cerumen mass away from the walls of the external canal.  Once I did this, I was able to suction away fragments of wax and debris using suction.  However, once I was deep in the canal, the cerumen was so hard and wedged into the anterior fornix that the pain was intolerable and we had to abandon the LEFT cleaning.        A/P - Gerardo Simpson  (H61.23) Bilateral impacted cerumen  (primary encounter diagnosis)    The patient has had their cerumen procedurally removed today and I was able to complete the LEFT this time.  His cerumenosis is dramatic, I will see him in 3 months for another ear check and debridement.          Again, thank you for allowing me to participate in the care of your patient.        Sincerely,        Jason Bangura MD

## 2020-08-17 ENCOUNTER — OFFICE VISIT (OUTPATIENT)
Dept: FAMILY MEDICINE | Facility: CLINIC | Age: 50
End: 2020-08-17
Payer: COMMERCIAL

## 2020-08-17 VITALS
HEIGHT: 68 IN | SYSTOLIC BLOOD PRESSURE: 136 MMHG | WEIGHT: 173 LBS | TEMPERATURE: 98.2 F | OXYGEN SATURATION: 99 % | HEART RATE: 69 BPM | DIASTOLIC BLOOD PRESSURE: 72 MMHG | BODY MASS INDEX: 26.22 KG/M2

## 2020-08-17 DIAGNOSIS — Z12.11 SCREEN FOR COLON CANCER: ICD-10-CM

## 2020-08-17 DIAGNOSIS — K64.8 INTERNAL HEMORRHOID, BLEEDING: Primary | ICD-10-CM

## 2020-08-17 PROCEDURE — 99213 OFFICE O/P EST LOW 20 MIN: CPT | Performed by: FAMILY MEDICINE

## 2020-08-17 RX ORDER — HYDROCORTISONE ACETATE 25 MG/1
25 SUPPOSITORY RECTAL 2 TIMES DAILY
Qty: 12 SUPPOSITORY | Refills: 0 | Status: SHIPPED | OUTPATIENT
Start: 2020-08-17

## 2020-08-17 ASSESSMENT — MIFFLIN-ST. JEOR: SCORE: 1614.22

## 2020-08-17 NOTE — PATIENT INSTRUCTIONS
Patient Education     Understanding Hemorrhoids    Hemorrhoid tissues are  cushions  of blood vessels that swell slightly during bowel movements. Too much pressure on the anal canal can make these tissues remain enlarged, become inflamed, and cause symptoms. This can happen both inside and outside the anal canal.  Parts of the anal canal  The parts of the anal canal are:    Internal hemorrhoid tissue is in the upper area of the anal canal.    The rectum is the last several inches of the colon. This is where stool is stored prior to bowel movements.    Anal sphincters are ring-shaped muscles that expand and contract to control the anal opening.    External hemorrhoid tissue lies under the anal skin.    The anus is the passage between the rectum and the outside of the body.  Normal hemorrhoid tissue  Hemorrhoid tissues play an important role in helping your body eliminate waste. Food passes from the stomach through the intestines. The waste (stool) then travels through the colon to the rectum. It is stored in the rectum until it s ready to be passed from the anus. During bowel movements, hemorrhoids swell with blood and become slightly larger. This swelling helps protect and cushion the anal canal as stool passes from the body. Once the stool has passed, the tissues stop swelling and return to normal.  Problem hemorrhoids  Pressure due to straining or other factors can cause hemorrhoid tissues to remain swollen. When this happens to the hemorrhoid tissues in the anal canal they re called internal hemorrhoids. Swollen tissues around the anal opening are called external hemorrhoids. Depending on the location, your symptoms can differ.    Internal hemorrhoids often happen in clusters around the wall of the anal canal. They are usually painless. But they may prolapse (protrude out of the anus) due to straining or pressure from hard stool. After the bowel movement is over, they may then reduce (return inside the body).  Internal hemorrhoids often bleed. They can also discharge mucus.      External hemorrhoids are located at the anal opening, just beneath the skin. These tissues rarely cause problems unless they thrombose (form a blood clot). When this happens, a hard, bluish lump may appear. A thrombosed hemorrhoid also causes sudden, severe pain. In time, the clot may go away on its own. This sometimes leaves a  skin tag  of tissue stretched by the clot.  Hemorrhoid symptoms  Hemorrhoid symptoms may include:    Pain or a burning sensation    Bleeding during bowel movements    Protrusion of tissue from the anus    Itching around the anus  Causes of hemorrhoids  There s no single cause of hemorrhoids. Most often, though, they are caused by too much pressure on the anal canal. This can be due to:    Chronic (ongoing) constipation    Straining during bowel movements    Sitting too long on the toilet    Strenuous exercise or heavy lifting    Pregnancy and childbirth    Aging    Diarrhea  Date Last Reviewed: 7/1/2016 2000-2019 [employer]. [address] [place] [address]. All rights reserved. This information is not intended as a substitute for professional medical care. Always follow your healthcare professional's instructions.           Patient Education     Colorectal Cancer Screening    Colorectal cancer is cancer in the colon or rectum. It is a leading cause of cancer deaths in the [place] But when this cancer is found and removed early, the chances of a full recovery are very good. Because colorectal cancer rarely causes symptoms in its early stages, screening for the disease is important. It s even more crucial if you have risk factors for the disease. Learn more about colorectal cancer and its risk factors. Then talk to your healthcare provider about being screened.  Risk factors for colorectal cancer  Your risk of having colorectal cancer increases if you:    Are 50 years of age or older    Have a family  history or personal history of colorectal cancer or polyps    Have a personal history of type 2 diabetes, Crohn s disease, or ulcerative colitis    Have an inherited genetic syndrome like Biswas syndrome (HNPCC) or familial adenomatous polyposis (FAP)    Are very overweight    Are not physically active    Smoke    Drink a lot of alcohol    Eat a lot of red or processed meat  The colon and rectum  Waste from food you eat enters the colon from the small intestine. As it travels through the colon, the waste (stool) loses water and becomes more solid. Intestinal muscles push it toward the sigmoid colon. This is the last section of the colon. Stool then moves into the rectum, where it s stored until it s ready to leave the body during a bowel movement.  How colorectal cancer starts  Polyps are growths that form on the inner lining of the colon or rectum. Most are benign, which means they aren t cancer. But over time, some polyps can become cancer (malignant). This happens when cells in these polyps begin growing abnormally. In time, malignant cells invade more of the colon and rectum. The cancer may also spread to nearby organs or lymph nodes or to other parts of the body. Finding and removing polyps can help prevent cancer from forming.  Your screening  Screening means looking for a health problem before you have symptoms. During screening for colorectal cancer, your healthcare provider will ask about your health history, examine you, and do 1 or more tests. To start, you may have:    Health history questions to answer. Your healthcare provider will ask about your health history. Mention if a family member has had colon cancer or polyps. Also mention any health problems you have had in the past.    Digital rectal exam (FAVIOLA). During a FAVIOLA, the healthcare provider inserts a lubricated gloved finger into the rectum. The test is painless and takes less than a minute. This test alone is not enough to screen for colorectal  cancer. You will also need one of the below tests.  Screening test choices  Fecal occult blood test (FOBT) or fecal immunochemical test (FIT)  These tests check for blood in stool that you can t see (hidden or occult blood). Hidden blood may be a sign of colon polyps or cancer. A small sample of stool is tested for blood in a laboratory. Most often, you collect this sample at home using a kit your healthcare provider gives you. Follow the instructions carefully for using this kit. You might need to not eat certain foods and not take certain medicines before the test, as directed.  Stool DNA test  This test looks for DNA changes in cells in the stool. These DNA changes might be signs of cancer. It also looks for hidden blood in stool. For this test, you collect an entire bowel movement. This is done using a special container put in the toilet. The sample is then sent to a lab for testing.  Barium enema with contrast (double-contrast barium enema)  This test uses X-rays to create images of the entire colon and rectum. The day before this test, you will need to do a bowel prep to clean out the colon and rectum. A bowel prep is a liquid diet plus strong laxatives or enemas. You will be awake for the test, but you may be given medicine to help you relax. At the start of the test, a healthcare provider who specializes in imaging tests (radiologist) places a soft tube into the rectum. The tube is used to fill the colon with a contrast liquid (barium) and air. This can be uncomfortable for some people. The liquid helps the colon show up clearly on the X-rays. Because the test uses X-rays, it exposes you to a small amount of radiation.  Virtual colonoscopy  This exam is also called a CT colonography. It uses a series of X-ray photographs to create a 3-D view of the colon and rectum. The day before the test, you will need to do a bowel prep to clean out your colon. Your healthcare provider will give you instructions on how to  do this. During the procedure, you will lie on a table that is part of a special X-ray machine called a CT scanner. A small tube will be placed into your rectum to fill the colon and rectum with air. This can be uncomfortable for some people. Then, the table will move into the machine and pictures will be taken of your colon and rectum. A computer will combine these photos to create a 3-D picture. Because the test uses X-rays, it exposes you to a small amount of radiation.  Scope exams  Here are 2 types of scope exams:    Colonoscopy. This test can be used to find and remove polyps anywhere in the colon or rectum. The day before the test, you will do a bowel prep. This is a liquid diet plus a strong laxative solution or an enema. The bowel prep will cleanse your colon. You will be given instructions for this. Just before the test, you are given a medicine to make you sleepy. Then, a long, flexible, lighted tube called a colonoscope is gently inserted into the rectum and guided through the entire colon. Images of the colon are viewed on a video screen. Any polyps that are found are removed and sent to a lab for testing. If a polyp can t be removed, a sample of tissue is taken and the polyp might be removed later during surgery. You will need to bring someone with you to drive you home after this test. Colonoscopy is the only screening test that lets your healthcare provider see the entire colon and rectum. This test also lets your healthcare provider remove any pieces of tissue that need to be looked at by a lab. If something suspicious is found using any other tests, you will likely need a colonoscopy.    Sigmoidoscopy. This test is similar to colonoscopy, but focuses only on the sigmoid colon and rectum. As with colonoscopy, bowel prep must be done the day before this test. It might not need to be as complete as the bowel prep for a colonoscopy. You are awake during the procedure, but you may be given medicine to  help you relax. During the test, the healthcare provider guides a thin, flexible, lighted tube called a sigmoidoscope through your rectum and lower colon. The images are displayed on a video screen. Polyps are removed, if possible, and sent to a lab for testing.     When to call your healthcare provider after a test  Call your healthcare provider if you have any of the following after any screening test:    Bleeding    Fever of 100.4 F (38 C) or higher, or as directed by your healthcare provider    Abdominal pain    Vomiting   Date Last Reviewed: 12/1/2017 2000-2019 [employer]. [address] [place] [address]. All rights reserved. This information is not intended as a substitute for professional medical care. Always follow your healthcare professional's instructions.           Patient Education     Anal Fistula  The anal canal is the end portion of the intestinal tract. It includes the rectum and anus. Sometimes, an abnormal passage forms from the anal canal to the skin near the anus. This is called an anal fistula. Anal fistulas can also form from the anal canal to other organs, such as the vagina or urinary tract.  An anal fistula most often occurs from an anal gland that has developed a pus-filled infection (abscess). A fistula can also occur with certain conditions, such as Crohn s disease or after radiation therapy for cancer. Injury to the anal canal and surgery can also lead to anal fistulas.  Symptoms of an anal fistula can include:    Pain in or near the rectum    Drainage, which may contain blood, pus, or both (the drainage may be constant or stop and start again)    Bleeding from the rectum    Urinary problems  If you have an anal abscess or infection along with a fistula, you may also notice redness, swelling, or soreness in or near the anus or rectum. You may have a fever as well. The abscess usually needs to be drained.  If caused by Crohn s disease, an anal fistula may  respond to medicines such as antibiotics and immunosuppressants. This may lead to complete closure of the fistula. But once treatment stops, there is a high chance that the fistula may form again.  Anal fistulas often require surgery if other treatments don t correct the problem. The type of surgery depends on the type of fistula and the cause of the fistula. More than one surgery may be required.  Please discuss all forms of treatment with your healthcare provider.  Home care  As you recover from treatment, make sure to take any prescribed medicines as directed. Don't take any over-the-counter medicines without first talking to your healthcare provider.  You may also be advised to:    Soak in a warm bath 3 or 4 times a day.    Wear a pad over your anal area as directed.    Eat a diet high in fiber.    Drink plenty of fluids.    Use a stool softener or bulk laxative as needed.    Return to your normal routine only after your healthcare provider says it's OK.  Follow-up care  Follow up with your healthcare provider, or as advised.  When to seek medical advice  Call your healthcare provider right away if any of these occur:    Fever of 100.4 F (38 C) or higher, or as directed by your healthcare provider    Hard or painful stools or trouble controlling your bowel movements    Symptoms of the anal fistula return, like pain or drainage    Increased pain, redness, swelling, or drainage in or near the anus or rectum    Mucus, pus, or blood in the stool (dark or bright red)    Pain in the belly that does not respond to treatment or that does not go away after a few hours    Swelling in the belly that does not go away after a few hours    Vomiting that won t stop  Resources  The resources below can help you learn more about anal fistulas. They may also help you find support if you have conditions such as Crohn s disease.    American Society of Colon and Rectal Surgeons, www.fascrs.org/patients    Crohn s and Colitis  [employer], www.ccfa.org  Date Last Reviewed: 3/1/2018    0327-7397 [employer]. [address] [facility], [place] [address]. All rights reserved. This information is not intended as a substitute for professional medical care. Always follow your healthcare professional's instructions.

## 2020-08-17 NOTE — PROGRESS NOTES
"Subjective     Gerardo Simpson is a 49 year old male who presents to clinic today for the following health issues:    HPI       Hemorrhoids  Onset: couple weeks     Description:   Pain: no   Itching: no     Accompanying Signs & Symptoms:  Blood streaked toilet paper: YES  Blood in stool: YES  Changes in stool pattern: YES- small and harder stools     History:     Previous: yes, treated for bleeding internal hemorrhoid 5/2017 with a suppository, which helped  Any previous GI studies done:none  Family History of colon cancer: no     Precipitating factors:   None    Alleviating factors:  None    Therapies Tried and outcome: drinking grape fruit juice,unsure if this helped        Past medical, family, and social histories, medications, and allergies are reviewed and updated in [facility].     Review of Systems   Constitutional, HEENT, cardiovascular, pulmonary, gi and gu systems are negative, except as otherwise noted. Lost 17# since March, on purpose, as he was told he should lose some weight.    OBJECTIVE:   BP (!) 144/76   Pulse 69   Temp 98.2  F (36.8  C) (Oral)   Ht 1.727 m (5' 8\")   Wt 78.5 kg (173 lb)   SpO2 99%   BMI 26.30 kg/m    Body mass index is 26.3 kg/m .  Physical Exam   GENERAL: healthy, alert and no distress  EYES: Eyes grossly normal to inspection, PERRL, EOMI, sclerae white and conjunctivae normal  MS: no gross musculoskeletal defects noted, no edema  SKIN: no suspicious lesions or rashes to visible skin  NEURO: Normal strength and tone, sensory exam grossly normal, mentation intact, oriented times 3 and cranial nerves 2-12 intact  PSYCH: mentation appears normal, affect normal/bright   RECTAL (male): normal sphincter tone, no rectal masses, prostate of normal size for age, smooth, nontender without masses/nodules, internal hemorrhoid present (no blood on glove) and there is a small lesion of the skin adjacent to the anus, on the midline posteriorly, which may have a central pore (suggesting a " possible fistula), however it is difficult to examine this minute area while wearing a face shield.    ASSESSMENT / PLAN:    ASSESSMENT/PLAN:  (K64.8) Internal hemorrhoid, bleeding  (primary encounter diagnosis)  Comment: Rule out anal fistula  Plan: COLORECTAL SURGERY REFERRAL, hydrocortisone         (ANUSOL-HC) 25 MG suppository        We will go ahead and treat the symptomatic hemorrhoid as he successfully did in the past.  I gave him information to understand anal fistula, and I recommend he meets with the rectal surgeon to further evaluate the condition of the anal area    (Z12.11) Screen for colon cancer  Comment: Considering he has not done colon cancer screening, and there is rectal bleeding present, I think it would be prudent for him to schedule a colonoscopy  Plan: GASTROENTEROLOGY ADULT REF PROCEDURE ONLY            Pavel Hidalgo MD

## 2020-12-23 NOTE — PROGRESS NOTES
"History of Present Illness - Gerardo Simpson is a 49 year old male last seen on 7/27/2020, when we had to procedurally disimpact the ears.  The RIGHT side was so difficult I had to place him on softening drops and see him back a second time, where we were successful.    He again returned on 3/10/2020 due to the same fullness and muffled hearing.  Otherwise no pain, no blood or purulence coming from the ears, no vertigo, no periauricular pain.    I was able to clear the RIGHT, but the LEFT was so impacted into the anterior fornix we had to abandon and place him drops.  He was supposed to have close follow up to complete the procedure, but due to Covid lockdown, he has not been seen until now.  At the 7/27/2020 visit, he again had the same issue where we cleared the RIGHT, but the LEFT was so deeply packed that it became intolerably painful.  I asked him to start using Debrox for a week prior to his visit with me.  He did it this time.    Past medical history -   Patient Active Problem List   Diagnosis     CARDIOVASCULAR SCREENING; LDL GOAL LESS THAN 160     Overweight (BMI 25.0-29.9)     Elevated blood pressure reading without diagnosis of hypertension     Chronic constipation     Pinguecula of both eyes     Bilateral impacted cerumen       BP (!) 140/77   Pulse 71   Resp 16   Ht 1.727 m (5' 8\")   Wt 78.5 kg (173 lb)   SpO2 98%   BMI 26.30 kg/m      General - The patient is well nourished and well developed, and appears to have good nutritional status.  Alert and oriented to person and place, answers questions and cooperates with examination appropriately.   Head and Face - Normocephalic and atraumatic, with no gross asymmetry noted of the contour of the facial features.  The facial nerve is intact, with strong symmetric movements.  Eyes - Extraocular movements intact, and the pupils were reactive to light.  Sclera were not icteric or injected, conjunctiva were pink and moist.  Mouth - Examination of the oral " cavity shows pink, healthy, moist mucosa.  No lesions or ulceration noted.  The dentition are in good repair.  The tongue is mobile and midline.    Cerumen Removal    Physical Exam and Procedure  Ears - On examination of the ears, I found that the BOTH sides had cerumen impaction.  Therefore, I positioned them in the examination chair in a semi-supine position, beginning with the right side.  I used the binocular surgical microscope to perform cerumen removal.  I began by using a cerumen loop to gently lift the edges of the cerumen mass away from the walls of the external canal.  Once I did this, I was able to suction away fragments of wax and debris using suction.  Once the mass was loose enough, the entire plug was pulled from the canal.  The tympanic membrane was intact, no sign of perforation or middle ear effusion.    I turned my attention to the contralateral side once again using the binocular surgical microscope to perform cerumen removal.  I began by using a cerumen loop to gently lift the edges of the cerumen mass away from the walls of the external canal.  Once I did this, I was able to suction away fragments of wax and debris using suction.  Eventually I was able to totally clear the LEFT this time.  The LEFT tympanic membrane is healthy.        A/P - Gerardo Simpson  (H61.23) Bilateral impacted cerumen  (primary encounter diagnosis)    The patient has had their cerumen procedurally removed today and I was able to complete the LEFT this time.  His cerumenosis is dramatic, I will see him in 4 months for another ear check and debridement.

## 2020-12-23 NOTE — PATIENT INSTRUCTIONS
Gerardo to follow up with Primary Care provider regarding elevated blood pressure.    Scheduling Information  To schedule your CT/MRI scan, please contact [facility] at [phone] OR [facility] at [phone]    To schedule your Surgery, please contact our Specialty Schedulers at [phone]      ENT Clinic Locations Clinic Hours Telephone Number     [facility]  [address] [place] [address]   Monday:           1:00pm -- 5:00pm    Friday:              8:00am - 12:00pm   To schedule/reschedule an appointment with   Dr. Bangura,   please contact our   Specialty Scheduling Department at:     [phone]       [facility]  [address] [place] [address] Tuesday:          8:00am -- 2:00pm         Urgent Care Locations Clinic Hours Telephone Numbers     [facility]  [address] [place] [address]     Monday-Friday:     11:00am - 9:00pm    Saturday-Sunday:  9:00am - 5:00pm   [phone]     [facility]  [address] [place] [address]     Monday-Friday:      5:00pm - 9:00pm     Saturday-Sunday:  9:00am - 5:00pm   [phone]

## 2020-12-28 ENCOUNTER — OFFICE VISIT (OUTPATIENT)
Dept: OTOLARYNGOLOGY | Facility: CLINIC | Age: 50
End: 2020-12-28
Payer: COMMERCIAL

## 2020-12-28 VITALS
HEART RATE: 71 BPM | BODY MASS INDEX: 26.22 KG/M2 | OXYGEN SATURATION: 98 % | SYSTOLIC BLOOD PRESSURE: 140 MMHG | DIASTOLIC BLOOD PRESSURE: 77 MMHG | HEIGHT: 68 IN | RESPIRATION RATE: 16 BRPM | WEIGHT: 173 LBS

## 2020-12-28 DIAGNOSIS — H61.23 BILATERAL IMPACTED CERUMEN: Primary | ICD-10-CM

## 2020-12-28 PROCEDURE — 69210 REMOVE IMPACTED EAR WAX UNI: CPT | Performed by: OTOLARYNGOLOGY

## 2020-12-28 ASSESSMENT — PAIN SCALES - GENERAL: PAINLEVEL: NO PAIN (0)

## 2020-12-28 ASSESSMENT — MIFFLIN-ST. JEOR: SCORE: 1609.22

## 2020-12-28 NOTE — LETTER
"    12/28/2020         RE: Gerardo Simpson  [address] [facility] [address]        Dear Colleague,    Thank you for referring your patient, Gerardo Simpson, to the [facility]. Please see a copy of my visit note below.    History of Present Illness - Gerardo Simpson is a 49 year old male last seen on 7/27/2020, when we had to procedurally disimpact the ears.  The RIGHT side was so difficult I had to place him on softening drops and see him back a second time, where we were successful.    He again returned on 3/10/2020 due to the same fullness and muffled hearing.  Otherwise no pain, no blood or purulence coming from the ears, no vertigo, no periauricular pain.    I was able to clear the RIGHT, but the LEFT was so impacted into the anterior fornix we had to abandon and place him drops.  He was supposed to have close follow up to complete the procedure, but due to Covid lockdown, he has not been seen until now.  At the 7/27/2020 visit, he again had the same issue where we cleared the RIGHT, but the LEFT was so deeply packed that it became intolerably painful.  I asked him to start using Debrox for a week prior to his visit with me.  He did it this time.    Past medical history -   Patient Active Problem List   Diagnosis     CARDIOVASCULAR SCREENING; LDL GOAL LESS THAN 160     Overweight (BMI 25.0-29.9)     Elevated blood pressure reading without diagnosis of hypertension     Chronic constipation     Pinguecula of both eyes     Bilateral impacted cerumen       BP (!) 140/77   Pulse 71   Resp 16   Ht 1.727 m (5' 8\")   Wt 78.5 kg (173 lb)   SpO2 98%   BMI 26.30 kg/m      General - The patient is well nourished and well developed, and appears to have good nutritional status.  Alert and oriented to person and place, answers questions and cooperates with examination appropriately.   Head and Face - Normocephalic and atraumatic, with no gross asymmetry noted of the contour of the " facial features.  The facial nerve is intact, with strong symmetric movements.  Eyes - Extraocular movements intact, and the pupils were reactive to light.  Sclera were not icteric or injected, conjunctiva were pink and moist.  Mouth - Examination of the oral cavity shows pink, healthy, moist mucosa.  No lesions or ulceration noted.  The dentition are in good repair.  The tongue is mobile and midline.    Cerumen Removal    Physical Exam and Procedure  Ears - On examination of the ears, I found that the BOTH sides had cerumen impaction.  Therefore, I positioned them in the examination chair in a semi-supine position, beginning with the right side.  I used the binocular surgical microscope to perform cerumen removal.  I began by using a cerumen loop to gently lift the edges of the cerumen mass away from the walls of the external canal.  Once I did this, I was able to suction away fragments of wax and debris using suction.  Once the mass was loose enough, the entire plug was pulled from the canal.  The tympanic membrane was intact, no sign of perforation or middle ear effusion.    I turned my attention to the contralateral side once again using the binocular surgical microscope to perform cerumen removal.  I began by using a cerumen loop to gently lift the edges of the cerumen mass away from the walls of the external canal.  Once I did this, I was able to suction away fragments of wax and debris using suction.  Eventually I was able to totally clear the LEFT this time.  The LEFT tympanic membrane is healthy.        A/P - Gerardo Simpson  (H61.23) Bilateral impacted cerumen  (primary encounter diagnosis)    The patient has had their cerumen procedurally removed today and I was able to complete the LEFT this time.  His cerumenosis is dramatic, I will see him in 4 months for another ear check and debridement.            Again, thank you for allowing me to participate in the care of your patient.         Sincerely,        Jason Bangura MD

## 2020-12-28 NOTE — NURSING NOTE
Jaxyalew to follow up with Primary Care provider regarding elevated blood pressure.  Rody Stroud MA

## 2021-04-24 ENCOUNTER — HEALTH MAINTENANCE LETTER (OUTPATIENT)
Age: 51
End: 2021-04-24

## 2021-08-10 NOTE — PROGRESS NOTES
History of Present Illness - Gerardo Simpson is a 49 year old male last seen on 12/28/20, when we had to procedurally disimpact the ears.  The RIGHT side was so difficult I had to place him on softening drops and see him back a second time, where we were successful.    He again returned on 3/10/2020 due to the same fullness and muffled hearing.  Otherwise no pain, no blood or purulence coming from the ears, no vertigo, no periauricular pain.    I was able to clear the RIGHT, but the LEFT was so impacted into the anterior fornix we had to abandon and place him drops.  He was supposed to have close follow up to complete the procedure, but due to Covid lockdown, he has not been seen until now.  At the 7/27/2020 visit, he again had the same issue where we cleared the RIGHT, but the LEFT was so deeply packed that it became intolerably painful.  I asked him to start using Debrox for a week prior to his visit with me.  He did it this time.    Past medical history -   Patient Active Problem List   Diagnosis     CARDIOVASCULAR SCREENING; LDL GOAL LESS THAN 160     Overweight (BMI 25.0-29.9)     Elevated blood pressure reading without diagnosis of hypertension     Chronic constipation     Pinguecula of both eyes     Bilateral impacted cerumen       There were no vitals taken for this visit.    General - The patient is well nourished and well developed, and appears to have good nutritional status.  Alert and oriented to person and place, answers questions and cooperates with examination appropriately.   Head and Face - Normocephalic and atraumatic, with no gross asymmetry noted of the contour of the facial features.  The facial nerve is intact, with strong symmetric movements.  Eyes - Extraocular movements intact, and the pupils were reactive to light.  Sclera were not icteric or injected, conjunctiva were pink and moist.  Mouth - Examination of the oral cavity shows pink, healthy, moist mucosa.  No lesions or ulceration  noted.  The dentition are in good repair.  The tongue is mobile and midline.    Cerumen Removal    Physical Exam and Procedure  Ears - On examination of the ears, I found that the BOTH sides had cerumen impaction.  Therefore, I positioned them in the examination chair in a semi-supine position, beginning with the right side.  I used the binocular surgical microscope to perform cerumen removal.  I began by using a cerumen loop to gently lift the edges of the cerumen mass away from the walls of the external canal.  Once I did this, I was able to suction away fragments of wax and debris using suction.  Once the mass was loose enough, the entire plug was pulled from the canal.  The tympanic membrane was intact, no sign of perforation or middle ear effusion.    I turned my attention to the contralateral side once again using the binocular surgical microscope to perform cerumen removal.  I began by using a cerumen loop to gently lift the edges of the cerumen mass away from the walls of the external canal.  Once I did this, I was able to suction away fragments of wax and debris using suction.  Eventually I was able to totally clear the LEFT this time.  The LEFT tympanic membrane is healthy.        A/P - Gerardo Calvin  (H61.23) Bilateral impacted cerumen  (primary encounter diagnosis)    The patient has had their cerumen procedurally removed today and I was able to complete the LEFT this time.  His cerumenosis is dramatic, I will see him in 4 months for another ear check and debridement.

## 2021-08-16 ENCOUNTER — OFFICE VISIT (OUTPATIENT)
Dept: OTOLARYNGOLOGY | Facility: CLINIC | Age: 51
End: 2021-08-16
Payer: COMMERCIAL

## 2021-08-16 VITALS
SYSTOLIC BLOOD PRESSURE: 150 MMHG | HEART RATE: 71 BPM | WEIGHT: 184.8 LBS | HEIGHT: 68 IN | DIASTOLIC BLOOD PRESSURE: 77 MMHG | OXYGEN SATURATION: 98 % | BODY MASS INDEX: 28.01 KG/M2

## 2021-08-16 DIAGNOSIS — H61.23 BILATERAL IMPACTED CERUMEN: Primary | ICD-10-CM

## 2021-08-16 PROCEDURE — 99213 OFFICE O/P EST LOW 20 MIN: CPT | Mod: 25 | Performed by: OTOLARYNGOLOGY

## 2021-08-16 PROCEDURE — 69210 REMOVE IMPACTED EAR WAX UNI: CPT | Performed by: OTOLARYNGOLOGY

## 2021-08-16 ASSESSMENT — MIFFLIN-ST. JEOR: SCORE: 1662.75

## 2021-08-16 NOTE — LETTER
8/16/2021         RE: Gerardo Simpson  [address] [facility] [address]        Dear Colleague,    Thank you for referring your patient, Gerardo Simpson, to the [facility]. Please see a copy of my visit note below.    History of Present Illness - Gerardo Simpson is a 49 year old male last seen on 12/28/20, when we had to procedurally disimpact the ears.  The RIGHT side was so difficult I had to place him on softening drops and see him back a second time, where we were successful.    He again returned on 3/10/2020 due to the same fullness and muffled hearing.  Otherwise no pain, no blood or purulence coming from the ears, no vertigo, no periauricular pain.    I was able to clear the RIGHT, but the LEFT was so impacted into the anterior fornix we had to abandon and place him drops.  He was supposed to have close follow up to complete the procedure, but due to Covid lockdown, he has not been seen until now.  At the 7/27/2020 visit, he again had the same issue where we cleared the RIGHT, but the LEFT was so deeply packed that it became intolerably painful.  I asked him to start using Debrox for a week prior to his visit with me.  He did it this time.    Past medical history -   Patient Active Problem List   Diagnosis     CARDIOVASCULAR SCREENING; LDL GOAL LESS THAN 160     Overweight (BMI 25.0-29.9)     Elevated blood pressure reading without diagnosis of hypertension     Chronic constipation     Pinguecula of both eyes     Bilateral impacted cerumen       There were no vitals taken for this visit.    General - The patient is well nourished and well developed, and appears to have good nutritional status.  Alert and oriented to person and place, answers questions and cooperates with examination appropriately.   Head and Face - Normocephalic and atraumatic, with no gross asymmetry noted of the contour of the facial features.  The facial nerve is intact, with strong symmetric  movements.  Eyes - Extraocular movements intact, and the pupils were reactive to light.  Sclera were not icteric or injected, conjunctiva were pink and moist.  Mouth - Examination of the oral cavity shows pink, healthy, moist mucosa.  No lesions or ulceration noted.  The dentition are in good repair.  The tongue is mobile and midline.    Cerumen Removal    Physical Exam and Procedure  Ears - On examination of the ears, I found that the BOTH sides had cerumen impaction.  Therefore, I positioned them in the examination chair in a semi-supine position, beginning with the right side.  I used the binocular surgical microscope to perform cerumen removal.  I began by using a cerumen loop to gently lift the edges of the cerumen mass away from the walls of the external canal.  Once I did this, I was able to suction away fragments of wax and debris using suction.  Once the mass was loose enough, the entire plug was pulled from the canal.  The tympanic membrane was intact, no sign of perforation or middle ear effusion.    I turned my attention to the contralateral side once again using the binocular surgical microscope to perform cerumen removal.  I began by using a cerumen loop to gently lift the edges of the cerumen mass away from the walls of the external canal.  Once I did this, I was able to suction away fragments of wax and debris using suction.  Eventually I was able to totally clear the LEFT this time.  The LEFT tympanic membrane is healthy.        A/P - Gerardo Simpson  (H61.23) Bilateral impacted cerumen  (primary encounter diagnosis)    The patient has had their cerumen procedurally removed today and I was able to complete the LEFT this time.  His cerumenosis is dramatic, I will see him in 4 months for another ear check and debridement.            Again, thank you for allowing me to participate in the care of your patient.        Sincerely,        Jason Bangura MD

## 2021-10-03 ENCOUNTER — HEALTH MAINTENANCE LETTER (OUTPATIENT)
Age: 51
End: 2021-10-03

## 2022-01-05 ENCOUNTER — IMMUNIZATION (OUTPATIENT)
Dept: NURSING | Facility: CLINIC | Age: 52
End: 2022-01-05
Payer: COMMERCIAL

## 2022-01-05 PROCEDURE — 0054A COVID-19,PF,PFIZER (12+ YRS): CPT

## 2022-01-05 PROCEDURE — 91305 COVID-19,PF,PFIZER (12+ YRS): CPT

## 2022-02-24 ENCOUNTER — MYC REFILL (OUTPATIENT)
Dept: FAMILY MEDICINE | Facility: CLINIC | Age: 52
End: 2022-02-24
Payer: COMMERCIAL

## 2022-02-24 DIAGNOSIS — K64.8 INTERNAL HEMORRHOID, BLEEDING: ICD-10-CM

## 2022-02-24 RX ORDER — HYDROCORTISONE ACETATE 25 MG/1
25 SUPPOSITORY RECTAL 2 TIMES DAILY
Qty: 12 SUPPOSITORY | Refills: 0 | Status: CANCELLED | OUTPATIENT
Start: 2022-02-24

## 2022-02-25 NOTE — TELEPHONE ENCOUNTER
"Patient has not been seen by PCP since 2020. The MAR reports that the patient is \"not taking\" this medication 8/16/2021.     Ruthie Bah RN, BSN   [facility]     "

## 2022-03-03 ENCOUNTER — VIRTUAL VISIT (OUTPATIENT)
Dept: FAMILY MEDICINE | Facility: CLINIC | Age: 52
End: 2022-03-03
Payer: COMMERCIAL

## 2022-03-03 DIAGNOSIS — Z12.11 COLON CANCER SCREENING: ICD-10-CM

## 2022-03-03 DIAGNOSIS — K64.8 INTERNAL HEMORRHOID, BLEEDING: Primary | ICD-10-CM

## 2022-03-03 PROCEDURE — 99215 OFFICE O/P EST HI 40 MIN: CPT | Mod: GT | Performed by: FAMILY MEDICINE

## 2022-03-03 RX ORDER — HYDROCORTISONE 25 MG/G
CREAM TOPICAL 2 TIMES DAILY PRN
Qty: 30 G | Refills: 0 | Status: SHIPPED | OUTPATIENT
Start: 2022-03-03

## 2022-03-03 NOTE — PATIENT INSTRUCTIONS
At [facility], we strive to deliver an exceptional experience to you, every time we see you. If you receive a survey, please complete it as we do value your feedback.  If you have MyChart, you can expect to receive results automatically within 24 hours of their completion.  Your provider will send a note interpreting your results as well.   If you do not have MyChart, you should receive your results in about a week by mail.    Your care team:                            Family Medicine Internal Medicine   MD Bryant Mac MD Shantel Branch-Fleming, MD Srinivasa Vaka, MD Katya Belousova, PALIZET Arzate, APRN CNP    Claude Chris, MD Pediatrics   Gerard Barrett, PALIZET Patino, CNP MD Sienna Coffman APRN CNP   MD Yane Green MD Deborah Mielke, MD Shyanne Nixon, APRN [facility]      Clinic hours: Monday - Thursday 7 am-6 pm; Fridays 7 am-5 pm.   Urgent care: Monday - Friday 10 am- 8 pm; Saturday and Sunday 9 am-5 pm.    Clinic: [phone]       [facility]: Monday - Thursday 8 am - 7 pm; Friday 8 am - 6 pm  [facility]: [phone]     Use [email or web address] for 24/7 diagnosis and treatment of dozens of conditions.

## 2022-03-03 NOTE — PROGRESS NOTES
Gerardo is a 51 year old who is being evaluated via a billable video visit.      How would you like to obtain your AVS? MyChart  If the video visit is dropped, the invitation should be resent by: Send to e-mail at: [email or web address]  Will anyone else be joining your video visit? No          Assessment & Plan     (K64.8) Internal hemorrhoid, bleeding  (primary encounter diagnosis)  Comment:   Plan: Adult General Surg Referral, hydrocortisone,         Perianal, (HYDROCORTISONE) 2.5 % cream            (Z12.11) Colon cancer screening  Comment:   Plan: Adult General Surg Referral                40 minutes spent on the date of the encounter doing chart review, patient visit and documentation       Return in about 10 weeks (around 5/12/2022) for full physical, lab tests, immuniztion update.    Pavel Hidalgo MD  [facility]    Judy Carrillo is a 51 year old who presents for the following health issues     History of Present Illness     Reason for visit:  Some previously diagnosed issue  Symptom onset:  1-2 weeks ago    He eats 2-3 servings of fruits and vegetables daily.He consumes 0 sweetened beverage(s) daily.He exercises with enough effort to increase his heart rate 20 to 29 minutes per day.  He exercises with enough effort to increase his heart rate 3 or less days per week.   He is taking medications regularly.             Review of Systems         Objective           Vitals:  No vitals were obtained today due to virtual visit.    Physical Exam   GENERAL: Healthy, alert and no distress  EYES: Eyes grossly normal to inspection.  No discharge or erythema, or obvious scleral/conjunctival abnormalities.  RESP: No audible wheeze, cough, or visible cyanosis.  No visible retractions or increased work of breathing.    SKIN: Visible skin clear. No significant rash, abnormal pigmentation or lesions.  NEURO: Cranial nerves grossly intact.  Mentation and speech appropriate for  age.  PSYCH: Mentation appears normal, affect normal/bright, judgement and insight intact, normal speech and appearance well-groomed.                Video-Visit Details    Type of service:  Video Visit    Video Start Time: 8:29 AM   Video End Time:8:49 AM    Originating Location (pt. Location): Home    Distant Location (provider location):  [facility]     Platform used for Video Visit: [employer]

## 2022-05-15 ENCOUNTER — HEALTH MAINTENANCE LETTER (OUTPATIENT)
Age: 52
End: 2022-05-15

## 2022-09-10 ENCOUNTER — HEALTH MAINTENANCE LETTER (OUTPATIENT)
Age: 52
End: 2022-09-10

## 2023-06-03 ENCOUNTER — HEALTH MAINTENANCE LETTER (OUTPATIENT)
Age: 53
End: 2023-06-03

## 2024-07-07 ENCOUNTER — HEALTH MAINTENANCE LETTER (OUTPATIENT)
Age: 54
End: 2024-07-07

## 2024-12-14 ENCOUNTER — TELEPHONE (OUTPATIENT)
Dept: FAMILY MEDICINE | Facility: CLINIC | Age: 54
End: 2024-12-14
Payer: COMMERCIAL

## 2024-12-14 NOTE — TELEPHONE ENCOUNTER
Order/Referral Request    Who is requesting: Patient    Orders being requested: TB blood test     Reason service is needed/diagnosis: for school    When are orders needed by: December 18th     Has this been discussed with Provider: No    Does patient have a preference on a Group/Provider/Facility? [facility]    Does patient have an appointment scheduled?: Yes: 12/18/24    Where to send orders: Place orders within Epic    Could we send this information to you in [facility] or would you prefer to receive a phone call?:   Patient would prefer a phone call   Okay to leave a detailed message?: Yes at Cell number on file:    Telephone Information:   Mobile [phone]

## 2024-12-16 NOTE — TELEPHONE ENCOUNTER
Called pt and lvm to call back and schedule a provider appt to get a tb test ordered. Lab appt cancelled. Pt needs orders from a provider and needs to be seen for this.

## 2024-12-18 ENCOUNTER — TELEPHONE (OUTPATIENT)
Dept: FAMILY MEDICINE | Facility: CLINIC | Age: 54
End: 2024-12-18

## 2024-12-18 ENCOUNTER — ALLIED HEALTH/NURSE VISIT (OUTPATIENT)
Dept: FAMILY MEDICINE | Facility: CLINIC | Age: 54
End: 2024-12-18
Payer: COMMERCIAL

## 2024-12-18 VITALS — TEMPERATURE: 97.2 F

## 2024-12-18 DIAGNOSIS — Z23 ENCOUNTER FOR IMMUNIZATION: Primary | ICD-10-CM

## 2024-12-18 DIAGNOSIS — Z11.1 SCREENING EXAMINATION FOR PULMONARY TUBERCULOSIS: Primary | ICD-10-CM

## 2024-12-18 PROCEDURE — 90715 TDAP VACCINE 7 YRS/> IM: CPT

## 2024-12-18 PROCEDURE — 99207 PR NO CHARGE NURSE ONLY: CPT

## 2024-12-18 PROCEDURE — 90471 IMMUNIZATION ADMIN: CPT

## 2024-12-18 NOTE — PROGRESS NOTES
Prior to immunization administration, verified patients identity using patient s name and date of birth. Please see Immunization Activity for additional information.     Is the patient's temperature normal (100.5 or less)? Yes     Patient MEETS CRITERIA. PROCEED with vaccine administration.      Patient instructed to remain in clinic for 15 minutes afterwards, and to report any adverse reactions.      Link to Ancillary Visit Immunization Standing Orders SmartSet     Screening performed by Arianna Mendoza MA on 12/18/2024 at 2:05 PM.

## 2024-12-18 NOTE — TELEPHONE ENCOUNTER
Order/Referral Request    Who is requesting: Patient     Orders being requested: TB test    Reason service is needed/diagnosis: Work is needing it    When are orders needed by: Before Wednesday appintment    Has this been discussed with Provider: Yes    Does patient have a preference on a Group/Provider/Facility? N/a    Does patient have an appointment scheduled?: Yes: 1/13/2024    Where to send orders: N/A    Could we send this information to you in [place] or would you prefer to receive a phone call?:   Patient would prefer a phone call   Okay to leave a detailed message?: Yes at Cell number on file:    Telephone Information:   Mobile [phone]

## 2024-12-19 NOTE — TELEPHONE ENCOUNTER
Pavel Hidalgo MD  [facility] Pool1 hour ago (11:25 AM)     JARAD  The requested orders were placed.  Please notify patient (if indicated).     Called and left a voicemail message that orders are in the chart.  Janice Crump [facility]   Primary Care

## 2024-12-22 ENCOUNTER — LAB (OUTPATIENT)
Dept: LAB | Facility: CLINIC | Age: 54
End: 2024-12-22
Payer: COMMERCIAL

## 2024-12-22 DIAGNOSIS — Z11.1 SCREENING EXAMINATION FOR PULMONARY TUBERCULOSIS: ICD-10-CM

## 2024-12-22 PROCEDURE — 86481 TB AG RESPONSE T-CELL SUSP: CPT

## 2024-12-22 PROCEDURE — 36415 COLL VENOUS BLD VENIPUNCTURE: CPT

## 2024-12-23 LAB
QUANTIFERON MITOGEN: 10 IU/ML
QUANTIFERON NIL TUBE: 0.03 IU/ML
QUANTIFERON TB1 TUBE: 0.04 IU/ML
QUANTIFERON TB2 TUBE: 0.04

## 2024-12-24 LAB
GAMMA INTERFERON BACKGROUND BLD IA-ACNC: 0.03 IU/ML
M TB IFN-G BLD-IMP: NEGATIVE
M TB IFN-G CD4+ BCKGRND COR BLD-ACNC: 9.97 IU/ML
MITOGEN IGNF BCKGRD COR BLD-ACNC: 0.01 IU/ML
MITOGEN IGNF BCKGRD COR BLD-ACNC: 0.01 IU/ML

## 2025-06-04 ENCOUNTER — OFFICE VISIT (OUTPATIENT)
Dept: FAMILY MEDICINE | Facility: CLINIC | Age: 55
End: 2025-06-04
Payer: COMMERCIAL

## 2025-06-04 VITALS
TEMPERATURE: 98.1 F | WEIGHT: 186 LBS | OXYGEN SATURATION: 99 % | RESPIRATION RATE: 20 BRPM | SYSTOLIC BLOOD PRESSURE: 136 MMHG | BODY MASS INDEX: 28.19 KG/M2 | HEIGHT: 68 IN | HEART RATE: 64 BPM | DIASTOLIC BLOOD PRESSURE: 72 MMHG

## 2025-06-04 DIAGNOSIS — Z11.59 NEED FOR HEPATITIS C SCREENING TEST: ICD-10-CM

## 2025-06-04 DIAGNOSIS — Z23 NEED FOR VACCINATION: ICD-10-CM

## 2025-06-04 DIAGNOSIS — Z13.1 SCREENING FOR DIABETES MELLITUS: ICD-10-CM

## 2025-06-04 DIAGNOSIS — Z13.6 CARDIOVASCULAR SCREENING; LDL GOAL LESS THAN 160: ICD-10-CM

## 2025-06-04 DIAGNOSIS — Z00.00 ROUTINE GENERAL MEDICAL EXAMINATION AT A HEALTH CARE FACILITY: Primary | ICD-10-CM

## 2025-06-04 DIAGNOSIS — Z12.5 PROSTATE CANCER SCREENING: ICD-10-CM

## 2025-06-04 DIAGNOSIS — Z12.11 SCREEN FOR COLON CANCER: ICD-10-CM

## 2025-06-04 DIAGNOSIS — H61.23 EXCESSIVE CERUMEN IN BOTH EAR CANALS: ICD-10-CM

## 2025-06-04 LAB
CHOLEST SERPL-MCNC: 194 MG/DL
FASTING STATUS PATIENT QL REPORTED: NO
FASTING STATUS PATIENT QL REPORTED: NO
GLUCOSE SERPL-MCNC: 139 MG/DL (ref 70–99)
HCV AB SERPL QL IA: NONREACTIVE
HDLC SERPL-MCNC: 49 MG/DL
LDLC SERPL CALC-MCNC: 125 MG/DL
NONHDLC SERPL-MCNC: 145 MG/DL
PSA SERPL DL<=0.01 NG/ML-MCNC: 2.44 NG/ML (ref 0–3.5)
TRIGL SERPL-MCNC: 99 MG/DL

## 2025-06-04 PROCEDURE — 99386 PREV VISIT NEW AGE 40-64: CPT | Mod: 25 | Performed by: FAMILY MEDICINE

## 2025-06-04 PROCEDURE — 3078F DIAST BP <80 MM HG: CPT | Performed by: FAMILY MEDICINE

## 2025-06-04 PROCEDURE — 82947 ASSAY GLUCOSE BLOOD QUANT: CPT | Performed by: FAMILY MEDICINE

## 2025-06-04 PROCEDURE — 90472 IMMUNIZATION ADMIN EACH ADD: CPT | Performed by: FAMILY MEDICINE

## 2025-06-04 PROCEDURE — 90750 HZV VACC RECOMBINANT IM: CPT | Performed by: FAMILY MEDICINE

## 2025-06-04 PROCEDURE — 91320 SARSCV2 VAC 30MCG TRS-SUC IM: CPT | Performed by: FAMILY MEDICINE

## 2025-06-04 PROCEDURE — 3075F SYST BP GE 130 - 139MM HG: CPT | Performed by: FAMILY MEDICINE

## 2025-06-04 PROCEDURE — 36415 COLL VENOUS BLD VENIPUNCTURE: CPT | Performed by: FAMILY MEDICINE

## 2025-06-04 PROCEDURE — 90480 ADMN SARSCOV2 VAC 1/ONLY CMP: CPT | Performed by: FAMILY MEDICINE

## 2025-06-04 PROCEDURE — 90471 IMMUNIZATION ADMIN: CPT | Performed by: FAMILY MEDICINE

## 2025-06-04 PROCEDURE — 86803 HEPATITIS C AB TEST: CPT | Performed by: FAMILY MEDICINE

## 2025-06-04 PROCEDURE — 90677 PCV20 VACCINE IM: CPT | Performed by: FAMILY MEDICINE

## 2025-06-04 PROCEDURE — 80061 LIPID PANEL: CPT | Performed by: FAMILY MEDICINE

## 2025-06-04 PROCEDURE — G0103 PSA SCREENING: HCPCS | Performed by: FAMILY MEDICINE

## 2025-06-04 SDOH — HEALTH STABILITY: PHYSICAL HEALTH: ON AVERAGE, HOW MANY DAYS PER WEEK DO YOU ENGAGE IN MODERATE TO STRENUOUS EXERCISE (LIKE A BRISK WALK)?: 3 DAYS

## 2025-06-04 NOTE — NURSING NOTE
Prior to immunization administration, verified patients identity using patient s name and date of birth. Please see Immunization Activity for additional information.     Screening Questionnaire for Adult Immunization    Are you sick today?   No   Do you have allergies to medications, food, a vaccine component or latex?   No   Have you ever had a serious reaction after receiving a vaccination?   No   Do you have a long-term health problem with heart, lung, kidney, or metabolic disease (e.g., diabetes), asthma, a blood disorder, no spleen, complement component deficiency, a cochlear implant, or a spinal fluid leak?  Are you on long-term aspirin therapy?   No   Do you have cancer, leukemia, HIV/AIDS, or any other immune system problem?   No   Do you have a parent, brother, or sister with an immune system problem?   No   In the past 3 months, have you taken medications that affect  your immune system, such as prednisone, other steroids, or anticancer drugs; drugs for the treatment of rheumatoid arthritis, Crohn s disease, or psoriasis; or have you had radiation treatments?   No   Have you had a seizure, or a brain or other nervous system problem?   No   During the past year, have you received a transfusion of blood or blood    products, or been given immune (gamma) globulin or antiviral drug?   No   For women: Are you pregnant or is there a chance you could become       pregnant during the next month?   No   Have you received any vaccinations in the past 4 weeks?   No     Immunization questionnaire answers were all negative.      Patient instructed to remain in clinic for 15 minutes afterwards, and to report any adverse reactions.     Screening performed by Sacha Tabares MA on 6/4/2025 at 3:44 PM.

## 2025-06-04 NOTE — PROGRESS NOTES
Preventive Care Visit  [facility]  Pavel Hidalgo MD, Family Medicine  Jun 4, 2025      Assessment & Plan     (Z00.00) Routine general medical examination at a health care facility  (primary encounter diagnosis)  Comment: Negative screening exam; up-to-date on preventive services.   Plan: Glucose, Lipid panel reflex to direct LDL         Non-fasting, COVID-19 12+ (PFIZER), Hepatitis C        Screen Reflex to HCV RNA Quant and Genotype,         Pneumococcal 20 Valent Conjugate (PCV20),         ZOSTER RECOMBINANT ADJUVANTED (SHINGRIX),         Prostate Specific Antigen Screen, Fecal         colorectal cancer screen FIT        Return in about 1 year (around 6/4/2026) for full physical.      (H61.23) Excessive cerumen in both ear canals  Comment: The patient prefers to have this handled by his ENT doctor (Sveta).   Plan: Adult ENT  Referral            (Z13.6) CARDIOVASCULAR SCREENING; LDL GOAL LESS THAN 160  Comment:   Plan: Lipid panel reflex to direct LDL Non-fasting            (Z13.1) Screening for diabetes mellitus  Comment:   Plan: Glucose            (Z11.59) Need for hepatitis C screening test  Comment: indications for screening discussed with the patient   Plan: Hepatitis C Screen Reflex to HCV RNA Quant and         Genotype            (Z12.11) Screen for colon cancer  Comment: patient prefers FIT over colonoscopy. I have ordered a colonoscopy twice over the past few years, but he ended up refusing to go. He is not having symptoms now.    Plan: Fecal colorectal cancer screen FIT            (Z12.5) Prostate cancer screening  Comment: indications for screening discussed with the patient   Plan: Prostate Specific Antigen Screen            (Z23) Need for vaccination  Comment: Shingrix #1  Plan: COVID-19 12+ (PFIZER), Pneumococcal 20 Valent         Conjugate (PCV20), ZOSTER RECOMBINANT         ADJUVANTED (SHINGRIX)            BMI  Estimated body mass index is 28.49 kg/m  as  "calculated from the following:    Height as of this encounter: 1.721 m (5' 7.76\").    Weight as of this encounter: 84.4 kg (186 lb).   Weight management plan: exercise recommendations, as summarized in the AVS.     Counseling  Appropriate preventive services were addressed with this patient via screening, questionnaire, or discussion as appropriate for fall prevention, nutrition, physical activity, Tobacco-use cessation, social engagement, weight loss and cognition.  Checklist reviewing preventive services available has been given to the patient.  Reviewed patient's diet, addressing concerns and/or questions.   He is at risk for lack of exercise and has been provided with information to increase physical activity for the benefit of his well-being.   The patient was instructed to see the dentist every 6 months.     Judy Carrillo is a 54 year old, presenting for the following:  Physical        6/4/2025     2:36 PM   Additional Questions   Roomed by Marylin   Accompanied by self      HPI  Advance Care Planning    Discussed advance care planning with patient; informed AVS has link to [facility].Information packet handed to the patient.         6/4/2025   General Health   How would you rate your overall physical health? Excellent   Feel stress (tense, anxious, or unable to sleep) Not at all         6/4/2025   Nutrition   Three or more servings of calcium each day? Yes   Diet: Regular (no restrictions)   How many servings of fruit and vegetables per day? (!) 0-1   How many sweetened beverages each day? 0-1         6/4/2025   Exercise   Days per week of moderate/strenous exercise 3 days         6/4/2025   Social Factors   Worry food won't last until get money to buy more No   Food not last or not have enough money for food? No   Do you have housing? (Housing is defined as stable permanent housing and does not include staying outside in a car, in a tent, in an abandoned building, in an overnight shelter, or " "couch-surfing.) Yes   Are you worried about losing your housing? No   Lack of transportation? No   Unable to get utilities (heat,electricity)? No         6/4/2025   Fall Risk   Fallen 2 or more times in the past year? No   Trouble with walking or balance? No          6/4/2025   Dental   Dentist two times every year? (!) NO           Today's PHQ-2 Score:       6/4/2025     2:40 PM   PHQ-2 ( 1999 [employer])   Q1: Little interest or pleasure in doing things 0   Q2: Feeling down, depressed or hopeless 0   PHQ-2 Score 0    Q1: Little interest or pleasure in doing things Not at all   Q2: Feeling down, depressed or hopeless Not at all   PHQ-2 Score 0       Patient-reported         6/4/2025   Substance Use   Alcohol more than 3/day or more than 7/wk No   Do you use any other substances recreationally? No     Social History     Tobacco Use    Smoking status: Never    Smokeless tobacco: Never   Substance Use Topics    Alcohol use: No    Drug use: No           6/4/2025   STI Screening   New sexual partner(s) since last STI/HIV test? No   Last PSA:   PSA   Date Value Ref Range Status   11/06/2018 1.42 0 - 4 ug/L Final     Comment:     Assay Method:  Chemiluminescence using Siemens Vista analyzer     ASCVD Risk   The ASCVD Risk score (Mariluz IH, et al., 2019) failed to calculate for the following reasons:    Cannot find a previous HDL lab    Cannot find a previous total cholesterol lab       Reviewed and updated as needed this visit by Provider   Tobacco   Meds  Problems  Med Hx  Surg Hx  Fam Hx          Review of Systems       Objective    Exam  /72 (BP Location: Right arm, Patient Position: Sitting, Cuff Size: Adult Large)   Pulse 64   Temp 98.1  F (36.7  C) (Temporal)   Resp 20   Ht 1.721 m (5' 7.76\")   Wt 84.4 kg (186 lb)   SpO2 99%   BMI 28.49 kg/m     Estimated body mass index is 28.49 kg/m  as calculated from the following:    Height as of this encounter: 1.721 m (5' 7.76\").    Weight as of this " encounter: 84.4 kg (186 lb).    Physical Exam  GENERAL: alert and no distress  EYES: Eyes with bilateral pingueculae, otherwise grossly normal to inspection, PERRL and conjunctivae and sclerae normal  HENT: ear canals completed filled with cerumen, nose and mouth without ulcers or lesions  NECK: no adenopathy, no asymmetry, masses, or scars  RESP: lungs clear to auscultation - no rales, rhonchi or wheezes  CV: regular rate and rhythm, normal S1 S2, no S3 or S4, no murmur, click or rub, no peripheral edema  ABDOMEN: soft, nontender, no hepatosplenomegaly, no masses and bowel sounds normal   (male): normal male genitalia without lesions or urethral discharge, no hernia  MS: no gross musculoskeletal defects noted, no edema  SKIN: no suspicious lesions or rashes  NEURO: Normal strength and tone, mentation intact and speech normal  PSYCH: mentation appears normal, affect normal/bright    This document serves as a record of the services and decisions personally performed and made by Dr. Hidalgo. It was created on his behalf by Karen Gonzalez, a trained medical scribe. The creation of this document is based the provider's statements to the medical scribe.  Karen Gonzalez, 2:54 PM         Signed Electronically by: Pavel Hidalgo MD

## 2025-06-04 NOTE — PATIENT INSTRUCTIONS
You should receive your second (final) shingles vaccine between 8/4/2025 & 12/4/2025.      Patient Education   Preventive Care Advice   This is general advice given by our system to help you stay healthy. However, your care team may have specific advice just for you. Please talk to your care team about your preventive care needs.  Nutrition  Eat 5 or more servings of fruits and vegetables each day.  Try wheat bread, brown rice and whole grain pasta (instead of white bread, rice, and pasta).  Get enough calcium and vitamin D. Check the label on foods and aim for 100% of the RDA (recommended daily allowance).  Lifestyle  Exercise at least 150 minutes each week  (30 minutes a day, 5 days a week).  Do muscle strengthening activities 2 days a week. These help control your weight and prevent disease.  No smoking.  Wear sunscreen to prevent skin cancer.  Have a dental exam and cleaning every 6 months.  Yearly exams  See your health care team every year to talk about:  Any changes in your health.  Any medicines your care team has prescribed.  Preventive care, family planning, and ways to prevent chronic diseases.  Shots (vaccines)   HPV shots (up to age 26), if you've never had them before.  Hepatitis B shots (up to age 59), if you've never had them before.  COVID-19 shot: Get this shot when it's due.  Flu shot: Get a flu shot every year.  Tetanus shot: Get a tetanus shot every 10 years.  Pneumococcal, hepatitis A, and RSV shots: Ask your care team if you need these based on your risk.  Shingles shot (for age 50 and up)  General health tests  Diabetes screening:  Starting at age 35, Get screened for diabetes at least every 3 years.  If you are younger than age 35, ask your care team if you should be screened for diabetes.  Cholesterol test: At age 39, start having a cholesterol test every 5 years, or more often if advised.  Bone density scan (DEXA): At age 50, ask your care team if you should have this scan for osteoporosis  (brittle bones).  Hepatitis C: Get tested at least once in your life.  STIs (sexually transmitted infections)  Before age 24: Ask your care team if you should be screened for STIs.  After age 24: Get screened for STIs if you're at risk. You are at risk for STIs (including HIV) if:  You are sexually active with more than one person.  You don't use condoms every time.  You or a partner was diagnosed with a sexually transmitted infection.  If you are at risk for HIV, ask about PrEP medicine to prevent HIV.  Get tested for HIV at least once in your life, whether you are at risk for HIV or not.  Cancer screening tests  Cervical cancer screening: If you have a cervix, begin getting regular cervical cancer screening tests starting at age 21.  Breast cancer scan (mammogram): If you've ever had breasts, begin having regular mammograms starting at age 40. This is a scan to check for breast cancer.  Colon cancer screening: It is important to start screening for colon cancer at age 45.  Have a colonoscopy test every 10 years (or more often if you're at risk) Or, ask your provider about stool tests like a FIT test every year or Cologuard test every 3 years.  To learn more about your testing options, visit:   .  For help making a decision, visit:   https://bit.ly/cl68060.  Prostate cancer screening test: If you have a prostate, ask your care team if a prostate cancer screening test (PSA) at age 55 is right for you.  Lung cancer screening: If you are a current or former smoker ages 50 to 80, ask your care team if ongoing lung cancer screenings are right for you.  For informational purposes only. Not to replace the advice of your health care provider. Copyright   2023 [employer]. All rights reserved. Clinically reviewed by the [facility]. [place] [id number] - REV 01/24.

## 2025-06-05 ENCOUNTER — PATIENT OUTREACH (OUTPATIENT)
Dept: CARE COORDINATION | Facility: CLINIC | Age: 55
End: 2025-06-05
Payer: COMMERCIAL

## 2025-06-09 ENCOUNTER — PATIENT OUTREACH (OUTPATIENT)
Dept: CARE COORDINATION | Facility: CLINIC | Age: 55
End: 2025-06-09
Payer: COMMERCIAL